# Patient Record
Sex: FEMALE | Race: WHITE | NOT HISPANIC OR LATINO | Employment: STUDENT | URBAN - METROPOLITAN AREA
[De-identification: names, ages, dates, MRNs, and addresses within clinical notes are randomized per-mention and may not be internally consistent; named-entity substitution may affect disease eponyms.]

---
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
## 2024-01-02 ENCOUNTER — AMB VIDEO VISIT (OUTPATIENT)
Dept: OTHER | Facility: HOSPITAL | Age: 19
End: 2024-01-02

## 2024-01-02 VITALS — WEIGHT: 150 LBS | RESPIRATION RATE: 18 BRPM | BODY MASS INDEX: 25.61 KG/M2 | HEIGHT: 64 IN | HEART RATE: 68 BPM

## 2024-01-02 DIAGNOSIS — N30.00 ACUTE CYSTITIS WITHOUT HEMATURIA: Primary | ICD-10-CM

## 2024-01-02 PROCEDURE — ECARE PR SL URGENT CARE VIRTUAL VISIT: Performed by: PHYSICIAN ASSISTANT

## 2024-01-02 RX ORDER — CEPHALEXIN 500 MG/1
500 CAPSULE ORAL EVERY 12 HOURS SCHEDULED
Qty: 14 CAPSULE | Refills: 0 | Status: SHIPPED | OUTPATIENT
Start: 2024-01-02 | End: 2024-01-09

## 2024-01-02 RX ORDER — PHENAZOPYRIDINE HYDROCHLORIDE 200 MG/1
200 TABLET, FILM COATED ORAL
Qty: 10 TABLET | Refills: 0 | Status: SHIPPED | OUTPATIENT
Start: 2024-01-02

## 2024-01-02 NOTE — CARE ANYWHERE EVISITS
Visit Summary for FLORENCIO FLORES - Gender: Female - Date of Birth: 2005  Date: [id number] - Duration: 12 minutes  Patient: FLORENCIO FLORES  Provider: Shannon Severino PA-C    Patient Contact Information  Address  [address] [place] [address]  [phone]    Visit Topics  uti [Added By: Self - 2024-01-02]    Triage Questions   What is your current physical address in the event of a medical emergency? Answer []  Are you allergic to any medications? Answer []  Are you now or could you be pregnant? Answer []  Do you have any immune system compromise or chronic lung   disease? Answer []  Do you have any vulnerable family members in the home (infant, pregnant, cancer, elderly)? Answer []     Conversation Transcripts  [0A][0A] [Notification] You are connected with Shannon Severino PA-C, Urgent Care Specialist.[0A][Notification] FLORENCIO FLORES is located in [place].[0A][Notification] FLORENCIO FLORES has shared health history...[0A]    Diagnosis  Acute cystitis without hematuria    Procedures  Value: 29400 Code: CPT-4 UNLISTED E&M SERVICE    Medications Prescribed    No prescriptions ordered    Electronically signed by: Severino PA-C, Shannon(NPI [id number])

## 2024-01-02 NOTE — PATIENT INSTRUCTIONS
"Schedule a follow-up appointment with your primary care physician for recheck in 2-3 days. If you cannot see your PCP, you can schedule a follow up appointment at a [facility]. Go to the emergency department if you develop any new or worsening symptoms including back pain, fevers, or anything else that is concerning.    Excuses can be found in \"Letters\" section of [employer] yasmin. Can print if opened from a web browser  Care Anywhere phone number is [phone] if you need assistance or have further questions    [phone] GRABIEL ([phone])  Schedule or Reschedule Outpatient Testing - Option 2  Billing - Option 3  General Info - Option 4  Cignifit Help - Option 5  Comprehensive Spine Program - Option 6   COVID - Option 7    Urinary Tract Infection in Women   WHAT YOU NEED TO KNOW:   A urinary tract infection (UTI) is caused by bacteria that get inside your urinary tract. Your urinary tract includes your kidneys, ureters, bladder, and urethra. A UTI is more common in your lower urinary tract, which includes your bladder and urethra.       DISCHARGE INSTRUCTIONS:   Return to the emergency department if:   You are urinating very little or not at all.    You have a high fever with shaking chills.    You have side or back pain that gets worse.    Call your doctor if:   You have a fever.    You do not feel better after 2 days of taking antibiotics.    You have new symptoms, such as blood or pus in your urine.    You are vomiting.    You have questions or concerns about your condition or care.    Medicines:   Antibiotics  treat a bacterial infection. If you have UTIs often (called recurrent UTIs), you may be given antibiotics to take regularly. You will be given directions for when and how to use antibiotics. The goal is to prevent UTIs but not cause antibiotic resistance by using antibiotics too often.    Medicines  may be given to decrease pain and burning when you urinate. They will also help decrease the feeling " that you need to urinate often. These medicines may make your urine orange or red.    Take your medicine as directed.  Contact your healthcare provider if you think your medicine is not helping or if you have side effects. Tell your provider if you are allergic to any medicine. Keep a list of the medicines, vitamins, and herbs you take. Include the amounts, and when and why you take them. Bring the list or the pill bottles to follow-up visits. Carry your medicine list with you in case of an emergency.    Follow up with your doctor as directed:  Write down your questions so you remember to ask them during your visits.  Prevent another UTI:   Empty your bladder often.  Urinate and empty your bladder as soon as you feel the need. Do not hold your urine for long periods of time.    Wipe from front to back after you urinate or have a bowel movement.  This will help prevent germs from getting into your urinary tract through your urethra.    Drink liquids as directed.  Ask how much liquid to drink each day and which liquids are best for you. You may need to drink more liquids than usual to help flush out the bacteria. Do not drink alcohol, caffeine, or citrus juices. These can irritate your bladder and increase your symptoms. Your healthcare provider may recommend cranberry juice to help prevent a UTI.    Urinate before and after you have sex.  This can help flush out bacteria passed during sex.    Do not douche or use feminine deodorants.  These can change the chemical balance in your vagina.    Change sanitary pads or tampons often.  This will help prevent germs from getting into your urinary tract.    Talk to your healthcare provider about your birth control method.  You may need to change your method if it is increasing your risk for UTIs.    Wear cotton underwear and clothes that are loose.  Tight pants and nylon underwear can trap moisture and cause bacteria to grow.    Vaginal estrogen may be recommended.  This  medicine helps prevent UTIs in women who have gone through menopause or are in jhonatan-menopause.    Do pelvic muscle exercises often.  Pelvic muscle exercises may help you start and stop urinating. Strong pelvic muscles may help you empty your bladder easier. Squeeze these muscles tightly for 5 seconds like you are trying to hold back urine. Then relax for 5 seconds. Gradually work up to squeezing for 10 seconds. Do 3 sets of 15 repetitions a day, or as directed.    © Copyright [employer] 2023 Information is for End User's use only and may not be sold, redistributed or otherwise used for commercial purposes.  The above information is an  only. It is not intended as medical advice for individual conditions or treatments. Talk to your doctor, nurse or pharmacist before following any medical regimen to see if it is safe and effective for you.

## 2024-01-02 NOTE — PROGRESS NOTES
Required Documentation:  Encounter provider Shannon D Severino, PA-C    Provider located at [facility]  VIRTUAL CARE   [address] [address]    Identify all parties in room with patient during virtual visit:  No one else    The patient was identified by name and date of birth. Asia Alford was informed that this is a telemedicine visit and that the visit is being conducted through the [employer] platform. She agrees to proceed..  My office door was closed. No one else was in the room.  She acknowledged consent and understanding of privacy and security of the video platform. The patient has agreed to participate and understands they can discontinue the visit at any time.    Verification of patient location:    Patient is located at home in the following state in which I hold an active license [place]    Patient is aware this is a billable service.     Reason for visit is No chief complaint on file.       Subjective  HPI   Pt reports UTI again started 2 days ago. Started menstruating 2-3 days ago, had severe menstrual cramps which did not improve for 24 hours despite heat and OTC pain meds. Reports dysuria, pain after urination, frequency, urgency, voiding small amounts. No new hematuria, back pain, fevers. Had temp 99.3 a few days ago. Denies change to vaginal discharge prior to starting mense, concern for STI.     Past Medical History:   Diagnosis Date    Encopresis 6/10/2021       No past surgical history on file.     No Known Allergies    Review of Systems   Constitutional:  Negative for fever.   HENT:  Negative for nosebleeds.    Eyes:  Negative for redness.   Respiratory:  Negative for shortness of breath.    Cardiovascular:  Negative for chest pain.   Gastrointestinal:  Negative for blood in stool.   Genitourinary:  Positive for dysuria, frequency, pelvic pain and urgency. Negative for hematuria.   Musculoskeletal:  Negative for gait problem.  "  Skin:  Negative for rash.   Neurological:  Negative for seizures.   Psychiatric/Behavioral:  Negative for behavioral problems.        Video Exam    Vitals:    01/02/24 1750   Pulse: 68   Resp: 18   Weight: 68 kg (150 lb)   Height: 5' 3.5\" (1.613 m)       Physical Exam  Constitutional:       General: She is not in acute distress.     Appearance: Normal appearance. She is not toxic-appearing.   HENT:      Head: Normocephalic and atraumatic.      Nose: No rhinorrhea.      Mouth/Throat:      Mouth: Mucous membranes are moist.   Eyes:      Conjunctiva/sclera: Conjunctivae normal.      Comments: glasses   Cardiovascular:      Rate and Rhythm: Normal rate.   Pulmonary:      Effort: Pulmonary effort is normal. No respiratory distress.      Breath sounds: No wheezing (no gross audible wheeze through computer).   Musculoskeletal:      Cervical back: Normal range of motion.   Skin:     Findings: No rash (on face or neck).   Neurological:      Mental Status: She is alert.      Cranial Nerves: No dysarthria or facial asymmetry.   Psychiatric:         Mood and Affect: Mood normal.         Behavior: Behavior normal.         Visit Time  Total Visit Duration: 12 minutes    Assessment/Plan:    Diagnoses and all orders for this visit:    Acute cystitis without hematuria  -     cephalexin (KEFLEX) 500 mg capsule; Take 1 capsule (500 mg total) by mouth every 12 (twelve) hours for 7 days  -     Urine culture; Future  -     phenazopyridine (PYRIDIUM) 200 mg tablet; Take 1 tablet (200 mg total) by mouth 3 (three) times a day with meals        Patient Instructions   Schedule a follow-up appointment with your primary care physician for recheck in 2-3 days. If you cannot see your PCP, you can schedule a follow up appointment at a [facility]. Go to the emergency department if you develop any new or worsening symptoms including back pain, fevers, or anything else that is concerning.    Excuses can be found in \"Letters\" section of " [employer] yasmin. Can print if opened from a web browser  Care Anywhere phone number is [phone] if you need assistance or have further questions    [phone] GRABIEL ([phone])  Schedule or Reschedule Outpatient Testing - Option 2  Billing - Option 3  General Info - Option 4  [employer] Help - Option 5  Comprehensive Spine Program - Option 6   COVID - Option 7    Urinary Tract Infection in Women   WHAT YOU NEED TO KNOW:   A urinary tract infection (UTI) is caused by bacteria that get inside your urinary tract. Your urinary tract includes your kidneys, ureters, bladder, and urethra. A UTI is more common in your lower urinary tract, which includes your bladder and urethra.       DISCHARGE INSTRUCTIONS:   Return to the emergency department if:   You are urinating very little or not at all.    You have a high fever with shaking chills.    You have side or back pain that gets worse.    Call your doctor if:   You have a fever.    You do not feel better after 2 days of taking antibiotics.    You have new symptoms, such as blood or pus in your urine.    You are vomiting.    You have questions or concerns about your condition or care.    Medicines:   Antibiotics  treat a bacterial infection. If you have UTIs often (called recurrent UTIs), you may be given antibiotics to take regularly. You will be given directions for when and how to use antibiotics. The goal is to prevent UTIs but not cause antibiotic resistance by using antibiotics too often.    Medicines  may be given to decrease pain and burning when you urinate. They will also help decrease the feeling that you need to urinate often. These medicines may make your urine orange or red.    Take your medicine as directed.  Contact your healthcare provider if you think your medicine is not helping or if you have side effects. Tell your provider if you are allergic to any medicine. Keep a list of the medicines, vitamins, and herbs you take. Include the amounts, and when and why  you take them. Bring the list or the pill bottles to follow-up visits. Carry your medicine list with you in case of an emergency.    Follow up with your doctor as directed:  Write down your questions so you remember to ask them during your visits.  Prevent another UTI:   Empty your bladder often.  Urinate and empty your bladder as soon as you feel the need. Do not hold your urine for long periods of time.    Wipe from front to back after you urinate or have a bowel movement.  This will help prevent germs from getting into your urinary tract through your urethra.    Drink liquids as directed.  Ask how much liquid to drink each day and which liquids are best for you. You may need to drink more liquids than usual to help flush out the bacteria. Do not drink alcohol, caffeine, or citrus juices. These can irritate your bladder and increase your symptoms. Your healthcare provider may recommend cranberry juice to help prevent a UTI.    Urinate before and after you have sex.  This can help flush out bacteria passed during sex.    Do not douche or use feminine deodorants.  These can change the chemical balance in your vagina.    Change sanitary pads or tampons often.  This will help prevent germs from getting into your urinary tract.    Talk to your healthcare provider about your birth control method.  You may need to change your method if it is increasing your risk for UTIs.    Wear cotton underwear and clothes that are loose.  Tight pants and nylon underwear can trap moisture and cause bacteria to grow.    Vaginal estrogen may be recommended.  This medicine helps prevent UTIs in women who have gone through menopause or are in jhonatan-menopause.    Do pelvic muscle exercises often.  Pelvic muscle exercises may help you start and stop urinating. Strong pelvic muscles may help you empty your bladder easier. Squeeze these muscles tightly for 5 seconds like you are trying to hold back urine. Then relax for 5 seconds. Gradually  work up to squeezing for 10 seconds. Do 3 sets of 15 repetitions a day, or as directed.    © Copyright [employer] 2023 Information is for End User's use only and may not be sold, redistributed or otherwise used for commercial purposes.  The above information is an  only. It is not intended as medical advice for individual conditions or treatments. Talk to your doctor, nurse or pharmacist before following any medical regimen to see if it is safe and effective for you.

## 2024-01-03 ENCOUNTER — LAB (OUTPATIENT)
Dept: LAB | Facility: CLINIC | Age: 19
End: 2024-01-03
Payer: COMMERCIAL

## 2024-01-03 DIAGNOSIS — N30.00 ACUTE CYSTITIS WITHOUT HEMATURIA: ICD-10-CM

## 2024-01-03 PROCEDURE — 87086 URINE CULTURE/COLONY COUNT: CPT

## 2024-01-04 LAB — BACTERIA UR CULT: NORMAL

## 2024-01-13 DIAGNOSIS — F41.1 GENERALIZED ANXIETY DISORDER: ICD-10-CM

## 2024-01-14 RX ORDER — CITALOPRAM HYDROBROMIDE 10 MG/1
10 TABLET ORAL DAILY
Qty: 90 TABLET | Refills: 1 | Status: SHIPPED | OUTPATIENT
Start: 2024-01-14

## 2024-01-15 DIAGNOSIS — K21.9 GASTROESOPHAGEAL REFLUX DISEASE WITHOUT ESOPHAGITIS: ICD-10-CM

## 2024-01-16 RX ORDER — FAMOTIDINE 20 MG/1
TABLET, FILM COATED ORAL
Qty: 180 TABLET | Refills: 0 | OUTPATIENT
Start: 2024-01-16

## 2024-01-16 NOTE — TELEPHONE ENCOUNTER
Patient not seen in office for over a year please call and schedule follow-up appointment.  No medication renewal until seen in office.  Thank you

## 2024-01-26 DIAGNOSIS — K58.2 IRRITABLE BOWEL SYNDROME WITH BOTH CONSTIPATION AND DIARRHEA: ICD-10-CM

## 2024-01-29 RX ORDER — LINACLOTIDE 290 UG/1
CAPSULE, GELATIN COATED ORAL
Qty: 30 CAPSULE | Refills: 2 | Status: SHIPPED | OUTPATIENT
Start: 2024-01-29

## 2024-02-14 ENCOUNTER — TELEPHONE (OUTPATIENT)
Dept: PSYCHIATRY | Facility: CLINIC | Age: 19
End: 2024-02-14

## 2024-02-14 NOTE — TELEPHONE ENCOUNTER
Patient is calling regarding cancelling an appointment.    Date/Time: 2/14/2024 11:30 am    Reason: pt lmm on 2/13/2024 with no reason given     Patient was rescheduled: YES [] NO [x]  If yes, when was Patient reschedule for:     Patient requesting call back to reschedule: YES [] NO [x]

## 2024-02-21 PROBLEM — N39.0 RECURRENT UTI: Status: RESOLVED | Noted: 2021-06-10 | Resolved: 2024-02-21

## 2024-02-28 ENCOUNTER — AMB VIDEO VISIT (OUTPATIENT)
Dept: OTHER | Facility: HOSPITAL | Age: 19
End: 2024-02-28

## 2024-02-28 DIAGNOSIS — J02.0 STREP PHARYNGITIS: Primary | ICD-10-CM

## 2024-02-28 PROCEDURE — ECARE PR SL URGENT CARE VIRTUAL VISIT: Performed by: PHYSICIAN ASSISTANT

## 2024-02-28 RX ORDER — AMOXICILLIN 500 MG/1
500 TABLET, FILM COATED ORAL 2 TIMES DAILY
Qty: 20 TABLET | Refills: 0 | Status: SHIPPED | OUTPATIENT
Start: 2024-02-28 | End: 2024-03-09

## 2024-02-28 NOTE — PROGRESS NOTES
Required Documentation:  Encounter provider Melly Rueda PA-C    Provider located at [facility]  VIRTUAL CARE   [address] [address]    Identify all parties in room with patient during virtual visit:  No one else    The patient was identified by name and date of birth. Asia Alford was informed that this is a telemedicine visit and that the visit is being conducted through the [facility] [employer] platform. She agrees to proceed..  My office door was closed. No one else was in the room.  She acknowledged consent and understanding of privacy and security of the video platform. The patient has agreed to participate and understands they can discontinue the visit at any time.    Verification of patient location:    Patient is located at Home in the following state in which I hold an active license [place]    Patient is aware this is a billable service.     Reason for visit is No chief complaint on file.       Subjective  HPI   Patient states that she started with a sore throat and then a 102 fever, bodyaches, headache, vomiting a few times.  Now her throat is hurting really bad.  She still has a fever of 100.   She denies congestion.She states its a really bad sore throat. She states there is white spots in her throat.     Past Medical History:   Diagnosis Date    Encopresis 6/10/2021       No past surgical history on file.     No Known Allergies    Review of Systems   Constitutional:  Positive for fever.   HENT:  Positive for sore throat.    Respiratory: Negative.     Gastrointestinal: Negative.    Musculoskeletal: Negative.    Neurological: Negative.    Psychiatric/Behavioral: Negative.         Video Exam    There were no vitals filed for this visit.    Physical Exam  Constitutional:       General: She is not in acute distress.     Appearance: Normal appearance. She is not ill-appearing.   HENT:      Nose: No congestion.      Mouth/Throat:      Pharynx:  Posterior oropharyngeal erythema present.      Comments: Pt reports exu  Pulmonary:      Effort: Pulmonary effort is normal.   Lymphadenopathy:      Cervical: Cervical adenopathy present.   Skin:     General: Skin is dry.   Neurological:      General: No focal deficit present.      Mental Status: She is alert and oriented to person, place, and time.   Psychiatric:         Mood and Affect: Mood normal.         Behavior: Behavior normal.         Visit Time  Total Visit Duration: 5 minutes    Assessment/Plan:    Diagnoses and all orders for this visit:    Strep pharyngitis  -     amoxicillin (AMOXIL) 500 MG tablet; Take 1 tablet (500 mg total) by mouth 2 (two) times a day for 10 days        Patient Instructions   Strep Throat   WHAT YOU NEED TO KNOW:   Strep throat is a throat infection caused by bacteria. It is easily spread from person to person.  DISCHARGE INSTRUCTIONS:   Call 911 for any of the following:   You have trouble breathing.      Return to the emergency department if:   You have new symptoms like a bad headache, stiff neck, chest pain, or vomiting.    You are drooling because you cannot swallow your spit.    Contact your healthcare provider if:   You have a fever.    You have a rash or ear pain.    You have green, yellow-brown, or bloody mucus when you cough or blow your nose.    You are unable to drink anything.    You have questions or concerns about your condition or care.    Medicines:   Antibiotics  help treat your strep throat. You should feel better within 2 to 3 days after you start antibiotics.    Take your medicine as directed.  Contact your healthcare provider if you think your medicine is not helping or if you have side effects. Tell your provider if you are allergic to any medicine. Keep a list of the medicines, vitamins, and herbs you take. Include the amounts, and when and why you take them. Bring the list or the pill bottles to follow-up visits. Carry your medicine list with you in case of  an emergency.    Manage your symptoms:   Use lozenges, ice, soft foods, or popsicles  to soothe your throat.    Drink juice, milk shakes, or soup  if your throat is too sore to eat solid food. Drinking liquids can also help prevent dehydration.    Gargle with salt water.  Mix ¼ teaspoon salt in a glass of warm water and gargle. This may help reduce swelling in your throat.    Do not smoke.  Nicotine and other chemicals in cigarettes and cigars can cause lung damage and make your symptoms worse. Ask your healthcare provider for information if you currently smoke and need help to quit. E-cigarettes or smokeless tobacco still contain nicotine. Talk to your healthcare provider before you use these products.    Return to work or school  24 hours after you start antibiotic medicine.  Prevent the spread of strep throat:   Wash your hands often.  Use soap and water. Wash your hands after you use the bathroom, change a child's diapers, or sneeze. Wash your hands before you prepare or eat food.     Do not share food or drinks.  Replace your toothbrush after you have taken antibiotics for 24 hours.    Follow up with your doctor as directed:  Write down your questions so you remember to ask them during your visits.  © Copyright [employer] 2023 Information is for End User's use only and may not be sold, redistributed or otherwise used for commercial purposes.  The above information is an  only. It is not intended as medical advice for individual conditions or treatments. Talk to your doctor, nurse or pharmacist before following any medical regimen to see if it is safe and effective for you.

## 2024-02-28 NOTE — CARE ANYWHERE EVISITS
Visit Summary for FLORENCIO FLORES - Gender: Female - Date of Birth: 2005  Date: [id number] - Duration: 4 minutes  Patient: FLORENCIO FLORES  Provider: Melly Rueda PA-C    Patient Contact Information  Address  [address] [place] [id number]  [phone]    Visit Topics  Flu-Like Symptoms [Added By: Self - 2024-02-28]  Fever [Added By: Self - 2024-02-28]  Headache [Added By: Self - 2024-02-28]    Triage Questions   What is your current physical address in the event of a medical emergency? Answer []  Are you allergic to any medications? Answer []  Are you now or could you be pregnant? Answer []  Do you have any immune system compromise or chronic lung   disease? Answer []  Do you have any vulnerable family members in the home (infant, pregnant, cancer, elderly)? Answer []     Conversation Transcripts  [0A][0A] [Notification] You are connected with Melly Rueda PA-C, Urgent Care Specialist.[0A][Notification] FLORENCIO FLORES is located in [place].[0A][Notification] FLORENCIO FLORES has shared health history...[0A]    Diagnosis  Streptococcal pharyngitis    Procedures  Value: [id number] Code: CPT-4 UNLISTED E&M SERVICE    Medications Prescribed    No prescriptions ordered    Electronically signed by: Melly Rueda PA-C(NPI [id number])

## 2024-03-01 ENCOUNTER — OFFICE VISIT (OUTPATIENT)
Dept: URGENT CARE | Facility: CLINIC | Age: 19
End: 2024-03-01
Payer: COMMERCIAL

## 2024-03-01 VITALS
RESPIRATION RATE: 20 BRPM | DIASTOLIC BLOOD PRESSURE: 64 MMHG | BODY MASS INDEX: 25.52 KG/M2 | OXYGEN SATURATION: 99 % | WEIGHT: 144 LBS | HEART RATE: 42 BPM | HEIGHT: 63 IN | TEMPERATURE: 96.9 F | SYSTOLIC BLOOD PRESSURE: 110 MMHG

## 2024-03-01 DIAGNOSIS — U07.1 COVID-19: Primary | ICD-10-CM

## 2024-03-01 LAB
S PYO AG THROAT QL: NEGATIVE
SARS-COV-2 AG UPPER RESP QL IA: POSITIVE
VALID CONTROL: ABNORMAL

## 2024-03-01 PROCEDURE — 99213 OFFICE O/P EST LOW 20 MIN: CPT | Performed by: FAMILY MEDICINE

## 2024-03-01 PROCEDURE — 87880 STREP A ASSAY W/OPTIC: CPT | Performed by: FAMILY MEDICINE

## 2024-03-01 PROCEDURE — 87811 SARS-COV-2 COVID19 W/OPTIC: CPT | Performed by: FAMILY MEDICINE

## 2024-03-01 RX ORDER — BENZONATATE 200 MG/1
200 CAPSULE ORAL 3 TIMES DAILY PRN
Qty: 20 CAPSULE | Refills: 0 | Status: SHIPPED | OUTPATIENT
Start: 2024-03-01

## 2024-03-01 RX ORDER — AZITHROMYCIN 250 MG/1
TABLET, FILM COATED ORAL
Qty: 6 TABLET | Refills: 0 | Status: SHIPPED | OUTPATIENT
Start: 2024-03-01 | End: 2024-03-05

## 2024-03-01 NOTE — PROGRESS NOTES
[facility]        NAME: Asia Alford is a 19 y.o. female  : 2005    MRN: [id number]  DATE: 2024  TIME: 10:41 AM    Assessment and Plan   COVID-19 [U07.1]  1. COVID-19  POCT rapid strepA    Poct Covid 19 Rapid Antigen Test    azithromycin (ZITHROMAX) 250 mg tablet    benzonatate (TESSALON) 200 MG capsule    phenol (CHLORASEPTIC) 1.4 % mucosal liquid    CANCELED: Throat culture        -Symptomatic treatment of covid/sore throat - azithromycin/tessalon pearls/chloraseptic.  Bigeminy noticed on her auscultation expected to resolve with resolution of infection.  -Pt declined paxlovid treatment.  -Encouraged fluids and rest.  -Stop amoxicillan as rapid strep was negative today.     Patient Instructions     Follow up with PCP in 3-5 days.  Proceed to  ER if symptoms worsen.    If tests have been performed at [facility], our office will contact you with results if changes need to be made to the care plan discussed with you at the visit.  [facility]    Chief Complaint     Chief Complaint   Patient presents with    Cold Like Symptoms     C/O coughing, body aches, nausea and Temp. 101 F for five days ago and a sore throat  and slightly mild ear pain four days ago. The patient had a virtual yasmin. And was prescribe amoxicillin but has not gotten better.         History of Present Illness     18 yo female presents with 4 day history of fever (Tmax 102), cough, and intense sore throat.  Pt denies chest pain, palpitations, sinus pain/pressure, congestion, or chills.  She did a virtual medicine call on day 2 of symptoms and was prescribed amoxicillan for suspected strep pharyngitis.  She has had no symptom relief with amoxicillin, tylenol, or motrin which prompted her visit today.      Review of Systems   Review of Systems   Constitutional:  Positive for fever. Negative for chills and diaphoresis.   HENT:  Positive for ear pain, postnasal drip and sore throat.  Negative for congestion, sinus pressure and sinus pain.    Respiratory:  Positive for cough. Negative for shortness of breath.    Cardiovascular:  Negative for chest pain.   Gastrointestinal:  Negative for abdominal pain.     Current Medications       Current Outpatient Medications:     amoxicillin (AMOXIL) 500 MG tablet, Take 1 tablet (500 mg total) by mouth 2 (two) times a day for 10 days, Disp: 20 tablet, Rfl: 0    azithromycin (ZITHROMAX) 250 mg tablet, Take 2 tablets today then 1 tablet daily x 4 days, Disp: 6 tablet, Rfl: 0    benzonatate (TESSALON) 200 MG capsule, Take 1 capsule (200 mg total) by mouth 3 (three) times a day as needed for cough, Disp: 20 capsule, Rfl: 0    citalopram (CeleXA) 10 mg tablet, TAKE 1 TABLET BY MOUTH EVERY DAY, Disp: 90 tablet, Rfl: 1    famotidine (PEPCID) 20 mg tablet, TAKE 1 TABLET BY MOUTH TWICE A DAY, Disp: 180 tablet, Rfl: 0    hydrOXYzine HCL (ATARAX) 10 mg tablet, Take 1 tablet (10 mg total) by mouth daily as needed for anxiety, Disp: 10 tablet, Rfl: 0    Linzess 290 MCG CAPS, TAKE 1 CAPSULE BY MOUTH EVERY DAY IN THE MORNING, Disp: 30 capsule, Rfl: 2    phenazopyridine (PYRIDIUM) 200 mg tablet, Take 1 tablet (200 mg total) by mouth 3 (three) times a day with meals, Disp: 10 tablet, Rfl: 0    phenol (CHLORASEPTIC) 1.4 % mucosal liquid, Apply 1 spray to the mouth or throat every 2 (two) hours as needed (Sore throat), Disp: 20 mL, Rfl: 0    Trulance 3 MG TABS, TAKE 1 TABLET BY MOUTH EVERY DAY, Disp: 30 tablet, Rfl: 35    Current Allergies     Allergies as of 03/01/2024    (No Known Allergies)            The following portions of the patient's history were reviewed and updated as appropriate: allergies, current medications, past family history, past medical history, past social history, past surgical history and problem list.     Past Medical History:   Diagnosis Date    Encopresis 6/10/2021       History reviewed. No pertinent surgical history.    Family History   Problem  "Relation Age of Onset    No Known Problems Mother     Heart attack Father     Early death Father     Heart disease Father     Breast cancer Maternal Grandmother     Diabetes Maternal Grandmother     Hypertension Maternal Grandmother     Cancer Maternal Grandfather         Lung    Heart disease Maternal Grandfather     Depression Paternal Grandmother     Diabetes Paternal Grandmother     Heart disease Paternal Grandmother     Hypertension Paternal Grandmother     Mental illness Paternal Grandmother     Heart disease Paternal Grandfather     Stroke Paternal Grandfather     No Known Problems Sister     No Known Problems Brother     No Known Problems Maternal Aunt     No Known Problems Maternal Uncle     No Known Problems Paternal Aunt     No Known Problems Paternal Uncle          Medications have been verified.        Objective   /64   Pulse (!) 42   Temp (!) 96.9 °F (36.1 °C)   Resp 20   Ht 5' 3\" (1.6 m)   Wt 65.3 kg (144 lb)   LMP 02/29/2024   SpO2 99%   BMI 25.51 kg/m²   Patient's last menstrual period was 02/29/2024.       Physical Exam     Physical Exam  Vitals and nursing note reviewed.   Constitutional:       General: She is in acute distress.      Appearance: Normal appearance. She is normal weight. She is not ill-appearing, toxic-appearing or diaphoretic.   HENT:      Head: Normocephalic and atraumatic.      Right Ear: Tympanic membrane normal.      Left Ear: Tympanic membrane normal.      Nose: Nose normal.      Mouth/Throat:      Mouth: Mucous membranes are moist.      Pharynx: No oropharyngeal exudate or posterior oropharyngeal erythema.   Eyes:      General:         Right eye: No discharge.         Left eye: No discharge.      Conjunctiva/sclera: Conjunctivae normal.   Cardiovascular:      Rate and Rhythm: Normal rate. Rhythm irregular.      Comments: Bigeminy coupled heartbeat.  Pulmonary:      Effort: Pulmonary effort is normal. No respiratory distress.      Breath sounds: Normal breath " sounds. No wheezing, rhonchi or rales.   Skin:     General: Skin is warm.      Findings: No erythema.   Neurological:      General: No focal deficit present.      Mental Status: She is alert and oriented to person, place, and time.   Psychiatric:         Mood and Affect: Mood normal.         Behavior: Behavior normal.         Thought Content: Thought content normal.         Judgment: Judgment normal.

## 2024-03-07 ENCOUNTER — OFFICE VISIT (OUTPATIENT)
Dept: GASTROENTEROLOGY | Facility: CLINIC | Age: 19
End: 2024-03-07
Payer: COMMERCIAL

## 2024-03-07 VITALS
BODY MASS INDEX: 25.27 KG/M2 | HEART RATE: 72 BPM | DIASTOLIC BLOOD PRESSURE: 67 MMHG | HEIGHT: 63 IN | WEIGHT: 142.6 LBS | SYSTOLIC BLOOD PRESSURE: 102 MMHG

## 2024-03-07 DIAGNOSIS — K58.2 IRRITABLE BOWEL SYNDROME WITH BOTH CONSTIPATION AND DIARRHEA: Primary | ICD-10-CM

## 2024-03-07 PROCEDURE — 99214 OFFICE O/P EST MOD 30 MIN: CPT | Performed by: INTERNAL MEDICINE

## 2024-03-07 NOTE — PROGRESS NOTES
[facility] - Outpatient Follow-up Note  Asia Alford 19 y.o. female MRN: [id number]  Encounter: [id number]          ASSESSMENT AND PLAN:      1. Irritable bowel syndrome with both constipation and diarrhea  Fortunately doing well on combination of Linzess 290 mcg plus Trulance 3 mg daily  She has therapeutic failure of polyethylene gycol/MiraLax, Dulcolax, psyllium fiber, trulance  Therapeutic failure of Linzess 290 mcg (at all doses)  - therapeutic failure of linzess 290 mcg + miralax  - therapeutic failure of trulance  We discussed consideration of EGD/colonoscopy for objective evaluation, and to rule out malignancy or other etiologies for her persistent constipation and IBS symptoms, patient deferred at this time  Fortunately she denies any warning signs or symptoms at this time,  We will proceed with noninvasive evaluation  - Celiac Disease Antibody Profile; Future  - CBC; Future  - Comprehensive metabolic panel; Future  - Chronic Hepatitis Panel; Future  - Iron Panel (Includes Ferritin, Iron Sat%, Iron, and TIBC); Future  - Calprotectin,Fecal; Future  - TSH, 3rd generation with Free T4 reflex; Future  ______________________________________________________________________    SUBJECTIVE:      Patient is a 19-year-old female who sees me in office visit follow-up for irritable bowel syndrome with constipation and diarrhea.  She was last seen in the office via virtual visit 2 years ago.  She is present with her brother at today's visit, who contributes to history.  She is taking Linzess 290 mcg plus Trulance 3 mg with her first meal of the day.  With this she has liquid bowel movements, approximately 1-6 times per day.  She does describe symptoms of incomplete evacuation, states that the frequency of her stools does not bother her, she is not having abdominal pain, bloating or other GI symptoms.  She does note changes associated with her menstrual cycle.  She denies any unintentional  "weight loss.    No previous endoscopic evaluation  Non-invasive evaluation negative for H pylori, stool studies, fecal calprotectin    Rx trulance 3 mg daily,   Linzess 290 mcg daily  Pepcid 20 mg daily    Abdominal Pain  This is a recurrent problem. The current episode started more than 1 year ago. The onset quality is sudden. The problem occurs every several days. The most recent episode lasted 4 hours. The problem has been gradually improving since onset. The pain is located in the epigastric region. The pain is at a severity of 7/10. The quality of the pain is described as cramping and sharp. Associated symptoms include constipation, diarrhea and flatus. Pertinent negatives include no anorexia, arthralgias, belching, dysuria, fever, frequency, headaches, hematochezia, hematuria, melena, myalgias, nausea or vomiting. The symptoms are relieved by being still, bowel movements, passing flatus and recumbency.     Answers submitted by the patient for this visit:  Abdominal Pain Questionnaire (Submitted on 3/7/2024)  Chief Complaint: Abdominal pain  Radiates to: LLQ, LUQ, RLQ, RUQ, back  weight loss: No      I personally reviewed external procedure reports.     Objective     Blood pressure 102/67, pulse 72, height 5' 3\" (1.6 m), weight 64.7 kg (142 lb 9.6 oz), last menstrual period 02/29/2024. Body mass index is 25.26 kg/m².      PHYSICAL EXAM:      General Appearance:   Alert, cooperative, no distress   HEENT:   Normocephalic, atraumatic, anicteric.     Neck:  Supple, symmetrical, trachea midline   Lungs:   Equal chest rise and unlabored breathing, normal effort, no coughing.    Heart::   No visualized JVD.   Abdomen:   Soft, non-tender, non-distended; normal bowel sounds; no masses, no organomegaly    Rectal:   Deferred    Extremities:  No cyanosis, clubbing or edema    Pulses:  2+ and symmetric    Skin:  No jaundice, rashes, or lesions      Lab Results:   Lab Results   Component Value Date    WBC 6.8 07/22/2021    " HGB 12.4 07/22/2021    HCT 38.7 07/22/2021    MCV 83 07/22/2021     07/22/2021       Lab Results   Component Value Date    SODIUM 139 07/22/2021    K 3.9 07/22/2021     07/22/2021    CO2 23 07/22/2021    AGAP 8 02/18/2021    BUN 14 07/22/2021    CREATININE 0.76 07/22/2021    GLUC 93 07/22/2021    CALCIUM 9.5 02/18/2021    AST 19 07/22/2021    ALT 10 07/22/2021    ALKPHOS 96 02/18/2021    TP 7.2 07/22/2021    TBILI 0.4 07/22/2021       I personally reviewed relevant labs.    Radiology Results:   No results found.    I personally reviewed relevant images in PACS.

## 2024-04-22 ENCOUNTER — TELEMEDICINE (OUTPATIENT)
Dept: PSYCHIATRY | Facility: CLINIC | Age: 19
End: 2024-04-22
Payer: COMMERCIAL

## 2024-04-22 DIAGNOSIS — F41.1 GENERALIZED ANXIETY DISORDER: Primary | ICD-10-CM

## 2024-04-22 PROCEDURE — 99213 OFFICE O/P EST LOW 20 MIN: CPT | Performed by: PHYSICIAN ASSISTANT

## 2024-04-22 RX ORDER — CITALOPRAM 20 MG/1
20 TABLET ORAL DAILY
Qty: 30 TABLET | Refills: 2 | Status: SHIPPED | OUTPATIENT
Start: 2024-04-22 | End: 2024-07-21

## 2024-04-22 RX ORDER — TIMOLOL MALEATE 5 MG/ML
1 SOLUTION/ DROPS OPHTHALMIC DAILY
COMMUNITY
Start: 2024-02-27

## 2024-04-22 NOTE — PSYCH
"This note was not shared with the patient due to reasonable likelihood of causing patient harm     Virtual Regular Visit    Problem List Items Addressed This Visit       Generalized anxiety disorder - Primary    Relevant Medications    citalopram (CeleXA) 20 mg tablet     Reason for visit is   Chief Complaint   Patient presents with    Medication Management    Follow-up     Encounter provider Inna Monet PA-C    Provider located at [address]  [address]  [facility] [address]  [phone]    Recent Visits  No visits were found meeting these conditions.  Showing recent visits within past 7 days and meeting all other requirements  Today's Visits  Date Type Provider Dept   04/22/24 Telemedicine Inna Monet PA-C  [facility]   Showing today's visits and meeting all other requirements  Future Appointments  No visits were found meeting these conditions.  Showing future appointments within next 150 days and meeting all other requirements       After connecting through [employer], the patient was identified by name and date of birth. Asia Alford was informed that this is a telemedicine visit and that the visit is being conducted through the Epic Embedded platform. She agrees to proceed. which may not be secure and therefore, might not be HIPAA-compliant.  My office door was closed. No one else was in the room.  She acknowledged consent and understanding of privacy and security of the video platform. The patient has agreed to participate and understands they can discontinue the visit at any time.    SUBJECTIVE:    Asia Alford is a 19 y.o. female with a history of anxiety who presents for virtual appointment today.  She is currently lying in bed and states \"I have pretty bad cramps today.\"  She adds \"the meds are working but it is hard to tell.\"  She does report that \"it is better than before.\"  She " "feels that her generalized anxiety has improved.  She reports \"I am not anxious about every little thing.\"  She is willing to trial a higher dose of her medication for better efficacy.    She notes that her sleep schedule has been fluctuating and she has been waking up often.    She reports an adequate appetite.    No suicidal or homicidal thought, plan, or intent.    No auditory or visual hallucinations.  No delusions.    HPI ROS Appetite Changes and Sleep: normal appetite frequent nighttime awakenings    Review Of Systems:     Constitutional Negative   ENT Negative   Cardiovascular Negative   Respiratory Negative   Gastrointestinal As noted in HPI   Genitourinary Negative   Musculoskeletal Negative   Integumentary Negative   Neurological Negative   Endocrine Negative   Other Symptoms Negative and None          Substance Abuse History:    Social History     Substance and Sexual Activity   Drug Use Never       Family Psychiatric History:     Family History   Problem Relation Age of Onset    No Known Problems Mother     Heart attack Father     Early death Father     Heart disease Father     Breast cancer Maternal Grandmother     Diabetes Maternal Grandmother     Hypertension Maternal Grandmother     Cancer Maternal Grandfather         Lung    Heart disease Maternal Grandfather     Depression Paternal Grandmother     Diabetes Paternal Grandmother     Heart disease Paternal Grandmother     Hypertension Paternal Grandmother     Mental illness Paternal Grandmother     Heart disease Paternal Grandfather     Stroke Paternal Grandfather     No Known Problems Sister     No Known Problems Brother     No Known Problems Maternal Aunt     No Known Problems Maternal Uncle     No Known Problems Paternal Aunt     No Known Problems Paternal Uncle        Social History     Socioeconomic History    Marital status: Single     Spouse name: Not on file    Number of children: Not on file    Years of education: Not on file    Highest " education level: Not on file   Occupational History    Not on file   Tobacco Use    Smoking status: Never    Smokeless tobacco: Never    Tobacco comments:     former vapor   Vaping Use    Vaping status: Every Day    Substances: Nicotine, Flavoring   Substance and Sexual Activity    Alcohol use: Never    Drug use: Never    Sexual activity: Never   Other Topics Concern    Not on file   Social History Narrative    ** Merged History Encounter **          Social Determinants of Health     Financial Resource Strain: Not on file   Food Insecurity: Not on file   Transportation Needs: Not on file   Physical Activity: Not on file   Stress: Not on file   Social Connections: Not on file   Intimate Partner Violence: Not on file   Housing Stability: Not on file       Past Medical History:   Diagnosis Date    Encopresis 6/10/2021       History reviewed. No pertinent surgical history.    Current Outpatient Medications   Medication Sig Dispense Refill    citalopram (CeleXA) 20 mg tablet Take 1 tablet (20 mg total) by mouth daily 30 tablet 2    Vienva 0.1-20 MG-MCG per tablet Take 1 tablet by mouth daily Take at the same time      benzonatate (TESSALON) 200 MG capsule Take 1 capsule (200 mg total) by mouth 3 (three) times a day as needed for cough 20 capsule 0    famotidine (PEPCID) 20 mg tablet TAKE 1 TABLET BY MOUTH TWICE A  tablet 0    hydrOXYzine HCL (ATARAX) 10 mg tablet Take 1 tablet (10 mg total) by mouth daily as needed for anxiety 10 tablet 0    Linzess 290 MCG CAPS TAKE 1 CAPSULE BY MOUTH EVERY DAY IN THE MORNING 30 capsule 2    phenazopyridine (PYRIDIUM) 200 mg tablet Take 1 tablet (200 mg total) by mouth 3 (three) times a day with meals 10 tablet 0    phenol (CHLORASEPTIC) 1.4 % mucosal liquid Apply 1 spray to the mouth or throat every 2 (two) hours as needed (Sore throat) 20 mL 0    Trulance 3 MG TABS TAKE 1 TABLET BY MOUTH EVERY DAY 30 tablet 35     No current facility-administered medications for this visit.         No Known Allergies    The following portions of the patient's history were reviewed and updated as appropriate: allergies, current medications, past family history, past medical history, past social history, past surgical history, and problem list.    OBJECTIVE:     Mental Status Examination:    Appearance age appropriate, casually dressed   Behavior pleasant, cooperative   Speech normal volume, normal pitch   Mood Anxious   Affect Mood congruent   Thought Processes logical   Associations intact associations   Thought Content normal   Perceptual Disturbances: none   Abnormal Thoughts  Risk Potential Suicidal ideation - None  Homicidal ideation - None  Potential for aggression - No   Orientation oriented to person, place, time/date and situation   Memory recent and remote memory grossly intact   Cosciousness alert and awake   Attention Span attention span and concentration are age appropriate   Intellect Appears to be of Average Intelligence   Insight age appropriate    Judgement good    Muscle Strength and  Gait muscle strength and tone were normal   Language no difficulty naming common objects   Fund of Knowledge displays adequate knowledge of current events   Pain none   Pain Scale 0        Laboratory Results: No results found for this or any previous visit.    Assessment/Plan:       Diagnoses and all orders for this visit:    Generalized anxiety disorder  -     citalopram (CeleXA) 20 mg tablet; Take 1 tablet (20 mg total) by mouth daily    Other orders  -     Vienva 0.1-20 MG-MCG per tablet; Take 1 tablet by mouth daily Take at the same time          Treatment Recommendations- Risks Benefits      Will increase Celexa to 20 mg daily for anxiety      Continue the following medication:    Atarax 10 mg daily as needed for anxiety    We will follow-up in 2 months or sooner if questions or concerns arise.  She is aware of emergent versus nonemergent mental health resources.  She is able to contract for her own  safety at this time.    Risks, Benefits And Possible Side Effects Of Medications:  discussed    Controlled Medication Discussion: not applicable     Psychotherapy Provided: no    Treatment Plan:    Completed and signed during the session: Not applicable - Treatment Plan not due at this session    I spent 23 minutes with the patient during this visit.    This note was completed in part utilizing Dragon dictation Software. Grammatical, translation, syntax errors, random word insertions, spelling mistakes, and incomplete sentences may be an occasional consequence of this system secondary to software limitations with voice recognition, ambient noise, and hardware issues. If you have any questions or concerns about the content, text, or information contained within the body of this dictation, please contact the provider for clarification.     Inna Monet PA-C 04/22/24

## 2024-04-22 NOTE — BH CRISIS PLAN
Client Name: Mariza Alford       Client YOB: 2005    Leta Safety Plan      Creation Date: 4/22/24 Update Date: 4/22/24   Created By: Inna Monet PA-C Last Updated By: Inna Monet PA-C      Step 1: Warning Signs:   Warning Signs   Stomach aches            Step 2: Internal Coping Strategies:   Internal Coping Strategies   Sleeping   Watch TV   Listen to music            Step 3: People and social settings that provide distraction:   Name Contact Information   Friends In cell phone   My mom In cell phone    Places   My room           Step 4: People whom I can ask for help during a crisis:      Name Contact Information    My mom In cell phone      Step 5: Professionals or agencies I can contact during a crisis:      Clinican/Agency Name Phone Emergency Contact    Inna Monet PA-C In cell phone         Crisis Phone Numbers:   Suicide Prevention Lifeline: Call or Text  935 Crisis Text Line: Text HOME to 997-090   Please note: Some [facility] do not have a separate number for Child/Adolescent specific crisis. If your county is not listed under Child/Adolescent, please call the adult number for your county      Adult Crisis Numbers: Child/Adolescent Crisis Numbers   [facility]: [phone] [facility]: [phone]   [facility]: [phone] [facility]: [phone]   [facility]: [phone] [place]: [phone]   [facility]: [phone] [place]: [phone]   [facility]: [phone]   [facility]: [phone]   [facility]: [phone]   [facility]: [phone] (daytime) [phone] (after hours, weekends, holidays)      Step 6: Making the environment safer (plan for lethal means safety):   Patient did not identify any lethal methods: Yes     Optional: What is most important to me and worth living for?      Leta Safety Plan. Cadence Coppola and Sivakumar Ferguson. Used with  permission of the authors.

## 2024-05-07 DIAGNOSIS — K58.2 IRRITABLE BOWEL SYNDROME WITH BOTH CONSTIPATION AND DIARRHEA: ICD-10-CM

## 2024-05-07 RX ORDER — LINACLOTIDE 290 UG/1
CAPSULE, GELATIN COATED ORAL
Qty: 30 CAPSULE | Refills: 2 | Status: SHIPPED | OUTPATIENT
Start: 2024-05-07

## 2024-06-24 DIAGNOSIS — F41.1 GENERALIZED ANXIETY DISORDER: ICD-10-CM

## 2024-06-24 NOTE — TELEPHONE ENCOUNTER
Medication Refill Request     Per patient must be 90 days supply for insurance.    Name of Medication Celexa 20 mg  Dose/Frequency 1qd  Quantity 90  Verified pharmacy   [x]  Verified ordering Provider   [x]  Does patient have enough for the next 3 days? Yes [] No [x]  Does patient have a follow-up appointment scheduled? Yes [x] No []   If so when is appointment: 7/29/2024

## 2024-06-25 RX ORDER — CITALOPRAM 20 MG/1
20 TABLET ORAL DAILY
Qty: 90 TABLET | Refills: 0 | Status: SHIPPED | OUTPATIENT
Start: 2024-06-25 | End: 2024-09-23

## 2024-07-01 ENCOUNTER — TELEPHONE (OUTPATIENT)
Dept: PSYCHIATRY | Facility: CLINIC | Age: 19
End: 2024-07-01

## 2024-07-01 NOTE — TELEPHONE ENCOUNTER
IC called pt from talk therapy wait list and left voice message to verify if pt is to remain on wait list and to get scheduling preferences for appt days and times.   Hydroxyzine Pregnancy And Lactation Text: This medication is not safe during pregnancy and should not be taken. It is also excreted in breast milk and breast feeding isn't recommended.

## 2024-07-08 NOTE — TELEPHONE ENCOUNTER
IC called pt from talk therapy wait list and left voice message about scheduling an appt with Marcia Basurto in the [facility] office.

## 2024-07-10 NOTE — TELEPHONE ENCOUNTER
Pt has not responded to 2 voice messages left about talk therapy, so pt is removed from wait list.

## 2024-07-22 DIAGNOSIS — K58.2 IRRITABLE BOWEL SYNDROME WITH BOTH CONSTIPATION AND DIARRHEA: ICD-10-CM

## 2024-07-23 RX ORDER — LINACLOTIDE 290 UG/1
CAPSULE, GELATIN COATED ORAL
Qty: 30 CAPSULE | Refills: 5 | Status: SHIPPED | OUTPATIENT
Start: 2024-07-23

## 2024-07-29 ENCOUNTER — TELEMEDICINE (OUTPATIENT)
Dept: PSYCHIATRY | Facility: CLINIC | Age: 19
End: 2024-07-29
Payer: COMMERCIAL

## 2024-07-29 ENCOUNTER — TELEPHONE (OUTPATIENT)
Dept: PSYCHIATRY | Facility: CLINIC | Age: 19
End: 2024-07-29

## 2024-07-29 DIAGNOSIS — F41.1 GENERALIZED ANXIETY DISORDER: Primary | ICD-10-CM

## 2024-07-29 PROCEDURE — 99213 OFFICE O/P EST LOW 20 MIN: CPT | Performed by: PHYSICIAN ASSISTANT

## 2024-07-29 RX ORDER — CITALOPRAM 40 MG/1
40 TABLET ORAL DAILY
Qty: 30 TABLET | Refills: 2 | Status: SHIPPED | OUTPATIENT
Start: 2024-07-29 | End: 2024-10-27

## 2024-07-29 NOTE — PSYCH
"This note was not shared with the patient due to reasonable likelihood of causing patient harm     Virtual Regular Visit    Problem List Items Addressed This Visit       Generalized anxiety disorder - Primary    Relevant Medications    citalopram (CeleXA) 40 mg tablet     Reason for visit is   Chief Complaint   Patient presents with    Medication Management    Follow-up     Encounter provider Inna Monet PA-C    Provider located at [address]  [address]  [facility] [id number] [phone]    Recent Visits  No visits were found meeting these conditions.  Showing recent visits within past 7 days and meeting all other requirements  Today's Visits  Date Type Provider Dept   07/29/24 Telephone Inna Monet PA-C [facility]   07/29/24 Telemedicine Inna Monet PA-C [facility]   Showing today's visits and meeting all other requirements  Future Appointments  No visits were found meeting these conditions.  Showing future appointments within next 150 days and meeting all other requirements       After connecting through [facility], the patient was identified by name and date of birth. Asia Alford was informed that this is a telemedicine visit and that the visit is being conducted through the Epic Embedded platform. She agrees to proceed. which may not be secure and therefore, might not be HIPAA-compliant.  My office door was closed. No one else was in the room.  She acknowledged consent and understanding of privacy and security of the video platform. The patient has agreed to participate and understands they can discontinue the visit at any time.    SUBJECTIVE:    Asia Alford is a 19 y.o. female with a history of anxiety who presents for virtual appointment today.  She reports that she has been \"pretty good.\"  She does notice that her medication is not working as well as it " initially did.  She has noticed some more anxiety.  She has been going to [place] to visit her grandmother and aunt.  They are going to be moving into her home for a period of time until they find her own place.  She reports she is little bit nervous about this because it is going to be a lot of people and 1 small space.  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  She is going to a concert in [facility] later this month.  She is visiting [place] in October.  She reports that she has had some difficulty falling asleep but when she falls asleep she sleeps well.  She is interested in increasing her Celexa today.    She is getting adequate nutrition.    No suicidal or homicidal thought, plan, or intent.    No auditory or visual hallucinations.  No delusions.    HPI ROS Appetite Changes and Sleep: normal appetite trouble falling asleep    Review Of Systems:     Constitutional Negative   ENT Negative   Cardiovascular Negative   Respiratory Negative   Gastrointestinal As noted in HPI   Genitourinary Negative   Musculoskeletal Negative   Integumentary Negative   Neurological Negative   Endocrine Negative   Other Symptoms Negative and None          Substance Abuse History:    Social History     Substance and Sexual Activity   Drug Use Never       Family Psychiatric History:     Family History   Problem Relation Age of Onset    No Known Problems Mother     Heart attack Father     Early death Father     Heart disease Father     Breast cancer Maternal Grandmother     Diabetes Maternal Grandmother     Hypertension Maternal Grandmother     Cancer Maternal Grandfather         Lung    Heart disease Maternal Grandfather     Depression Paternal Grandmother     Diabetes Paternal Grandmother     Heart disease Paternal Grandmother     Hypertension Paternal Grandmother     Mental illness Paternal Grandmother     Heart disease Paternal Grandfather     Stroke Paternal Grandfather     No Known Problems Sister     No  Known Problems Brother     No Known Problems Maternal Aunt     No Known Problems Maternal Uncle     No Known Problems Paternal Aunt     No Known Problems Paternal Uncle        Social History     Socioeconomic History    Marital status: Single     Spouse name: Not on file    Number of children: Not on file    Years of education: Not on file    Highest education level: Not on file   Occupational History    Not on file   Tobacco Use    Smoking status: Never    Smokeless tobacco: Never    Tobacco comments:     former vapor   Vaping Use    Vaping status: Every Day    Substances: Nicotine, Flavoring   Substance and Sexual Activity    Alcohol use: Never    Drug use: Never    Sexual activity: Never   Other Topics Concern    Not on file   Social History Narrative    ** Merged History Encounter **          Social Determinants of Health     Financial Resource Strain: Not on file   Food Insecurity: Not on file   Transportation Needs: Not on file   Physical Activity: Not on file   Stress: Not on file   Social Connections: Not on file   Intimate Partner Violence: Not on file   Housing Stability: Not on file       Past Medical History:   Diagnosis Date    Encopresis 6/10/2021       History reviewed. No pertinent surgical history.    Current Outpatient Medications   Medication Sig Dispense Refill    citalopram (CeleXA) 40 mg tablet Take 1 tablet (40 mg total) by mouth daily 30 tablet 2    benzonatate (TESSALON) 200 MG capsule Take 1 capsule (200 mg total) by mouth 3 (three) times a day as needed for cough 20 capsule 0    famotidine (PEPCID) 20 mg tablet TAKE 1 TABLET BY MOUTH TWICE A  tablet 0    hydrOXYzine HCL (ATARAX) 10 mg tablet Take 1 tablet (10 mg total) by mouth daily as needed for anxiety 10 tablet 0    linaCLOtide (Linzess) 290 MCG CAPS TAKE 1 CAPSULE BY MOUTH EVERY DAY IN THE MORNING 30 capsule 5    phenazopyridine (PYRIDIUM) 200 mg tablet Take 1 tablet (200 mg total) by mouth 3 (three) times a day with meals 10  tablet 0    phenol (CHLORASEPTIC) 1.4 % mucosal liquid Apply 1 spray to the mouth or throat every 2 (two) hours as needed (Sore throat) 20 mL 0    Trulance 3 MG TABS TAKE 1 TABLET BY MOUTH EVERY DAY 30 tablet 35    Vienva 0.1-20 MG-MCG per tablet Take 1 tablet by mouth daily Take at the same time       No current facility-administered medications for this visit.        No Known Allergies    The following portions of the patient's history were reviewed and updated as appropriate: allergies, current medications, past family history, past medical history, past social history, past surgical history, and problem list.    OBJECTIVE:     Mental Status Examination:    Appearance age appropriate, casually dressed   Behavior pleasant, cooperative   Speech normal volume, normal pitch   Mood Slightly anxious   Affect Mood congruent   Thought Processes logical   Associations intact associations   Thought Content normal   Perceptual Disturbances: none   Abnormal Thoughts  Risk Potential Suicidal ideation - None  Homicidal ideation - None  Potential for aggression - No   Orientation oriented to person, place, time/date and situation   Memory recent and remote memory grossly intact   Cosciousness alert and awake   Attention Span attention span and concentration are age appropriate   Intellect Appears to be of Average Intelligence   Insight age appropriate    Judgement good    Muscle Strength and  Gait muscle strength and tone were normal   Language no difficulty naming common objects   Fund of Knowledge displays adequate knowledge of current events   Pain none   Pain Scale 0        Laboratory Results: No results found for this or any previous visit.    Assessment/Plan:       Diagnoses and all orders for this visit:    Generalized anxiety disorder  -     citalopram (CeleXA) 40 mg tablet; Take 1 tablet (40 mg total) by mouth daily          Treatment Recommendations- Risks Benefits      Will increase Celexa to 40 mg daily for  anxiety      Continue the following medication:    Atarax 10 mg daily as needed for anxiety    We will follow-up in 1-2 months or sooner if questions or concerns arise.  She is aware of emergent versus nonemergent mental health resources.  She is able to contract for her own safety at this time.    Risks, Benefits And Possible Side Effects Of Medications:  discussed    Controlled Medication Discussion: not applicable     Psychotherapy Provided: no    Treatment Plan:    Completed and signed during the session: Not applicable - Treatment Plan not due at this session    I spent 21 minutes with the patient during this visit.    This note was completed in part utilizing Dragon dictation Software. Grammatical, translation, syntax errors, random word insertions, spelling mistakes, and incomplete sentences may be an occasional consequence of this system secondary to software limitations with voice recognition, ambient noise, and hardware issues. If you have any questions or concerns about the content, text, or information contained within the body of this dictation, please contact the provider for clarification.     Inna Monet PA-C 07/29/24

## 2024-07-29 NOTE — TELEPHONE ENCOUNTER
Called patient but had to leave voice mail message requesting call back to schedule 6 week follow up appointment from today with Inna Monet PA-C. Also left message patient must sign virtual consent form in documents (sent via my chart) in order to keep her virtual appointments. Awaiting Call Back.

## 2024-08-28 DIAGNOSIS — F41.1 GENERALIZED ANXIETY DISORDER: ICD-10-CM

## 2024-08-28 RX ORDER — CITALOPRAM HYDROBROMIDE 40 MG/1
40 TABLET ORAL DAILY
Qty: 90 TABLET | Refills: 1 | Status: SHIPPED | OUTPATIENT
Start: 2024-08-28

## 2024-09-21 DIAGNOSIS — K21.9 GASTROESOPHAGEAL REFLUX DISEASE WITHOUT ESOPHAGITIS: ICD-10-CM

## 2024-09-23 RX ORDER — FAMOTIDINE 20 MG/1
TABLET, FILM COATED ORAL
Qty: 180 TABLET | Refills: 0 | Status: SHIPPED | OUTPATIENT
Start: 2024-09-23

## 2024-09-30 DIAGNOSIS — K58.2 IRRITABLE BOWEL SYNDROME WITH BOTH CONSTIPATION AND DIARRHEA: ICD-10-CM

## 2024-09-30 DIAGNOSIS — R10.31 RIGHT LOWER QUADRANT ABDOMINAL PAIN: ICD-10-CM

## 2024-09-30 DIAGNOSIS — R14.0 ABDOMINAL BLOATING: ICD-10-CM

## 2024-10-01 RX ORDER — PLECANATIDE 3 MG/1
1 TABLET ORAL DAILY
Qty: 30 TABLET | Refills: 35 | Status: SHIPPED | OUTPATIENT
Start: 2024-10-01

## 2024-11-06 ENCOUNTER — TELEPHONE (OUTPATIENT)
Age: 19
End: 2024-11-06

## 2024-11-06 NOTE — TELEPHONE ENCOUNTER
PA for TRULANCE SUBMITTED to [facility]    via    [x]CMM-KEY: [id number]  []Surescripts-Case ID #   []Availity-Auth ID # NDC #   []Faxed to plan   []Other website   []Phone call Case ID #     All office notes, labs and other pertaining documents and studies sent. Clinical questions answered. Awaiting determination from insurance company.     All Prior Authorizations are sent as Urgent to all insurance companies, however Prior Authorizations can take anywhere from 7-21 days for a response from your insurance company. You can gladly reach out to your insurance company to help expedite this process.

## 2024-11-08 NOTE — TELEPHONE ENCOUNTER
PA for TRULANCE APPROVED     Date(s) approved 11/6/24-11/6/25    Case #    Patient advised by          []Trendlrhart Message  []Phone call   [x]LMOM  []L/M to call office as no active Communication consent on file  []Unable to leave detailed message as VM not approved on Communication consent       Pharmacy advised by    [x]Fax  []Phone call    Approval letter scanned into Media Yes

## 2024-12-19 DIAGNOSIS — K21.9 GASTROESOPHAGEAL REFLUX DISEASE WITHOUT ESOPHAGITIS: ICD-10-CM

## 2024-12-19 RX ORDER — FAMOTIDINE 20 MG/1
20 TABLET, FILM COATED ORAL 2 TIMES DAILY
Qty: 180 TABLET | Refills: 1 | Status: SHIPPED | OUTPATIENT
Start: 2024-12-19

## 2025-01-30 ENCOUNTER — TELEPHONE (OUTPATIENT)
Age: 20
End: 2025-01-30

## 2025-01-30 NOTE — TELEPHONE ENCOUNTER
Patient called in to schedule a f/u MM appt.     Patient is now scheduled for 1/31 @10:30am virtually

## 2025-02-06 DIAGNOSIS — F41.1 GENERALIZED ANXIETY DISORDER: ICD-10-CM

## 2025-02-06 DIAGNOSIS — K58.2 IRRITABLE BOWEL SYNDROME WITH BOTH CONSTIPATION AND DIARRHEA: ICD-10-CM

## 2025-02-06 RX ORDER — LINACLOTIDE 290 UG/1
CAPSULE, GELATIN COATED ORAL
Qty: 30 CAPSULE | Refills: 5 | Status: SHIPPED | OUTPATIENT
Start: 2025-02-06

## 2025-02-06 RX ORDER — CITALOPRAM HYDROBROMIDE 40 MG/1
40 TABLET ORAL DAILY
Qty: 30 TABLET | Refills: 0 | Status: SHIPPED | OUTPATIENT
Start: 2025-02-06

## 2025-02-28 DIAGNOSIS — F41.1 GENERALIZED ANXIETY DISORDER: ICD-10-CM

## 2025-03-04 RX ORDER — CITALOPRAM HYDROBROMIDE 40 MG/1
40 TABLET ORAL DAILY
Qty: 90 TABLET | Refills: 1 | OUTPATIENT
Start: 2025-03-04

## 2025-03-05 ENCOUNTER — TELEPHONE (OUTPATIENT)
Age: 20
End: 2025-03-05

## 2025-03-05 ENCOUNTER — TELEMEDICINE (OUTPATIENT)
Age: 20
End: 2025-03-05

## 2025-03-05 DIAGNOSIS — K58.1 IRRITABLE BOWEL SYNDROME WITH CONSTIPATION: ICD-10-CM

## 2025-03-05 DIAGNOSIS — R14.0 ABDOMINAL BLOATING: ICD-10-CM

## 2025-03-05 DIAGNOSIS — K21.9 GASTROESOPHAGEAL REFLUX DISEASE, UNSPECIFIED WHETHER ESOPHAGITIS PRESENT: ICD-10-CM

## 2025-03-05 DIAGNOSIS — K58.2 IRRITABLE BOWEL SYNDROME WITH BOTH CONSTIPATION AND DIARRHEA: Primary | ICD-10-CM

## 2025-03-05 DIAGNOSIS — R10.31 RIGHT LOWER QUADRANT ABDOMINAL PAIN: ICD-10-CM

## 2025-03-05 RX ORDER — PRUCALOPRIDE 2 MG/1
2 TABLET ORAL DAILY
Qty: 30 TABLET | Refills: 5 | Status: SHIPPED | OUTPATIENT
Start: 2025-03-05 | End: 2025-03-10 | Stop reason: SDUPTHER

## 2025-03-05 RX ORDER — PLECANATIDE 3 MG/1
1 TABLET ORAL DAILY
Qty: 90 TABLET | Refills: 3 | Status: SHIPPED | OUTPATIENT
Start: 2025-03-05 | End: 2025-03-10 | Stop reason: SDUPTHER

## 2025-03-05 RX ORDER — LINACLOTIDE 290 UG/1
290 CAPSULE, GELATIN COATED ORAL DAILY
Qty: 90 CAPSULE | Refills: 3 | Status: SHIPPED | OUTPATIENT
Start: 2025-03-05 | End: 2025-03-10 | Stop reason: SDUPTHER

## 2025-03-05 NOTE — LETTER
2025     TALITA Sanchez  [address] [facility] [address]    Patient: Asia Alford   YOB: 2005   Date of Visit: 3/5/2025       Dear Dr. Woods:    Thank you for referring Asia Alford to me for evaluation. Below are my notes for this consultation.    If you have questions, please do not hesitate to call me. I look forward to following your patient along with you.         Sincerely,        Marcus Salcido PA-C        CC: No Recipients    Marcus Salcido PA-C  3/5/2025  3:08 PM  Incomplete  Name: Asia Alford      : 2005      MRN: [id number]  Encounter Provider: Marcus Salcido PA-C  Encounter Date: 3/5/2025   Encounter department: [facility]  :  Assessment & Plan  Irritable bowel syndrome with both constipation and diarrhea  -Mostly constipation  -Despite using both Linzess 290 mcg and Trulance 3 mg she was still not going every day having to push and not feeling like bowel movements complete  -Continue Linzess and Trulance  -Add Motegrity  -Encourage good daily fluids and fiber       Gastroesophageal reflux disease, unspecified whether esophagitis present  -Previously on famotidine 20 mg twice daily  -Currently stable on diet  Also recommend:  - avoid NSAIDS  - avoid eating within 3 hours of sleeping  - elevated head of bed 4-6 inches while sleeping  - avoid trigger foods  - recommend daily supply of calcium and vitamin D         Right lower quadrant abdominal pain         Abdominal bloating             History of Present Illness  Asia Alford is a 20 y.o. female new to me with past medical history of irritable bowel mainly constipation, generalized anxiety who presents for follow-up and med refill.    She was last seen in the office in 2024 with Dr. Valdes for irritable bowel.  She had previous therapeutic failures on Linzess all doses and Trulance.  Labs were ordered including  TSH, fecal calprotectin, iron, chronic hepatitis panel, CBC, CMP and celiac but not done.    Despite taking Linzess and Trulance daily she still not going every day and having to push in bowel movements do not feel complete.  No black or bloody stools.    HPI  History obtained from: patient  Review of Systems   Constitutional:  Negative for activity change, appetite change, chills, diaphoresis, fatigue and unexpected weight change.   HENT:  Negative for mouth sores, sore throat and trouble swallowing.    Eyes:  Negative for pain, redness and visual disturbance.   Respiratory:  Negative for apnea, chest tightness and shortness of breath.    Cardiovascular:  Negative for chest pain and leg swelling.   Gastrointestinal:  Positive for constipation. Negative for abdominal distention, abdominal pain, anal bleeding, blood in stool, diarrhea, nausea and vomiting.   Genitourinary:  Negative for difficulty urinating, dysuria and hematuria.   Musculoskeletal:  Negative for arthralgias, back pain, gait problem, joint swelling and myalgias.   Skin:  Negative for color change, pallor and rash.   Allergic/Immunologic: Negative for environmental allergies and food allergies.   Neurological:  Negative for dizziness, weakness, light-headedness, numbness and headaches.   Psychiatric/Behavioral:  Negative for agitation and behavioral problems.     A complete review of systems is negative other than that noted above in the HPI.    Past Medical History  Past Medical History:   Diagnosis Date   • Encopresis 6/10/2021     No past surgical history on file.  Family History   Problem Relation Age of Onset   • No Known Problems Mother    • Heart attack Father    • Early death Father    • Heart disease Father    • Breast cancer Maternal Grandmother    • Diabetes Maternal Grandmother    • Hypertension Maternal Grandmother    • Cancer Maternal Grandfather         Lung   • Heart disease Maternal Grandfather    • Depression Paternal Grandmother     • Diabetes Paternal Grandmother    • Heart disease Paternal Grandmother    • Hypertension Paternal Grandmother    • Mental illness Paternal Grandmother    • Heart disease Paternal Grandfather    • Stroke Paternal Grandfather    • No Known Problems Sister    • No Known Problems Brother    • No Known Problems Maternal Aunt    • No Known Problems Maternal Uncle    • No Known Problems Paternal Aunt    • No Known Problems Paternal Uncle       reports that she has never smoked. She has never used smokeless tobacco. She reports that she does not drink alcohol and does not use drugs.  Current Outpatient Medications   Medication Instructions   • benzonatate (TESSALON) 200 mg, Oral, 3 times daily PRN   • citalopram (CELEXA) 40 mg, Oral, Daily   • famotidine (PEPCID) 20 mg, Oral, 2 times daily   • hydrOXYzine HCL (ATARAX) 10 mg, Oral, Daily PRN   • Linzess 290 MCG CAPS TAKE 1 CAPSULE BY MOUTH EVERY DAY IN THE MORNING   • phenazopyridine (PYRIDIUM) 200 mg, Oral, 3 times daily with meals   • phenol (CHLORASEPTIC) 1.4 % mucosal liquid 1 spray, Mouth/Throat, Every 2 hour PRN   • Trulance 3 MG TABS 1 tablet, Oral, Daily   • Vienva 0.1-20 MG-MCG per tablet 1 tablet, Oral, Daily, Take at the same time   No Known Allergies   Current Outpatient Medications on File Prior to Visit   Medication Sig Dispense Refill   • benzonatate (TESSALON) 200 MG capsule Take 1 capsule (200 mg total) by mouth 3 (three) times a day as needed for cough 20 capsule 0   • citalopram (CeleXA) 40 mg tablet TAKE 1 TABLET BY MOUTH EVERY DAY 30 tablet 0   • famotidine (PEPCID) 20 mg tablet TAKE 1 TABLET BY MOUTH TWICE A  tablet 1   • hydrOXYzine HCL (ATARAX) 10 mg tablet Take 1 tablet (10 mg total) by mouth daily as needed for anxiety 10 tablet 0   • Linzess 290 MCG CAPS TAKE 1 CAPSULE BY MOUTH EVERY DAY IN THE MORNING 30 capsule 5   • phenazopyridine (PYRIDIUM) 200 mg tablet Take 1 tablet (200 mg total) by mouth 3 (three) times a day with meals 10 tablet  0   • phenol (CHLORASEPTIC) 1.4 % mucosal liquid Apply 1 spray to the mouth or throat every 2 (two) hours as needed (Sore throat) 20 mL 0   • Trulance 3 MG TABS TAKE 1 TABLET BY MOUTH EVERY DAY 30 tablet 35   • Vienva 0.1-20 MG-MCG per tablet Take 1 tablet by mouth daily Take at the same time       No current facility-administered medications on file prior to visit.      Social History     Tobacco Use   • Smoking status: Never   • Smokeless tobacco: Never   • Tobacco comments:     former vapor   Vaping Use   • Vaping status: Every Day   • Substances: Nicotine, Flavoring   Substance and Sexual Activity   • Alcohol use: Never   • Drug use: Never   • Sexual activity: Never     Current Outpatient Medications   Medication Sig Dispense Refill   • benzonatate (TESSALON) 200 MG capsule Take 1 capsule (200 mg total) by mouth 3 (three) times a day as needed for cough 20 capsule 0   • citalopram (CeleXA) 40 mg tablet TAKE 1 TABLET BY MOUTH EVERY DAY 30 tablet 0   • famotidine (PEPCID) 20 mg tablet TAKE 1 TABLET BY MOUTH TWICE A  tablet 1   • hydrOXYzine HCL (ATARAX) 10 mg tablet Take 1 tablet (10 mg total) by mouth daily as needed for anxiety 10 tablet 0   • Linzess 290 MCG CAPS TAKE 1 CAPSULE BY MOUTH EVERY DAY IN THE MORNING 30 capsule 5   • phenazopyridine (PYRIDIUM) 200 mg tablet Take 1 tablet (200 mg total) by mouth 3 (three) times a day with meals 10 tablet 0   • phenol (CHLORASEPTIC) 1.4 % mucosal liquid Apply 1 spray to the mouth or throat every 2 (two) hours as needed (Sore throat) 20 mL 0   • Trulance 3 MG TABS TAKE 1 TABLET BY MOUTH EVERY DAY 30 tablet 35   • Vienva 0.1-20 MG-MCG per tablet Take 1 tablet by mouth daily Take at the same time       No current facility-administered medications for this visit.     Objective  There were no vitals taken for this visit.    Physical Exam  Vitals reviewed.   Constitutional:       General: She is not in acute distress.     Appearance: Normal appearance. She is not  ill-appearing.   HENT:      Head: Normocephalic and atraumatic.   Eyes:      General: No scleral icterus.     Conjunctiva/sclera: Conjunctivae normal.   Pulmonary:      Effort: Pulmonary effort is normal. No respiratory distress.   Neurological:      Mental Status: She is alert and oriented to person, place, and time.   Psychiatric:         Mood and Affect: Mood normal.         Behavior: Behavior normal.            Lab Results: I personally reviewed relevant lab results. CBC/BMP: No new results in last 24 hours. , LFTs: No new results in last 24 hours. , PTT/INR:No new results in last 24 hours.     Radiology Results Review : No pertinent imaging studies reviewed.          Marcus Salcido PA-C  [facility] - Gastroentrology      Marcus Salcido PA-C  3/5/2025  2:58 PM  Sign when Signing Visit  Name: Asia Alford      : 2005      MRN: [id number]  Encounter Provider: Marcus Salcido PA-C  Encounter Date: 3/5/2025   Encounter department: [facility]  :  Assessment & Plan  Irritable bowel syndrome with both constipation and diarrhea  -Mostly constipation  -Previous failures with all doses of Linzess and Trulance  -Courage good daily fluids and fiber       Gastroesophageal reflux disease, unspecified whether esophagitis present  -Previously on famotidine 20 mg twice daily  -Currently stable on diet  Also recommend:  - avoid NSAIDS  - avoid eating within 3 hours of sleeping  - elevated head of bed 4-6 inches while sleeping  - avoid trigger foods  - recommend daily supply of calcium and vitamin D             History of Present Illness  Asia Alford is a 20 y.o. female new to me with past medical history of irritable bowel mainly constipation, generalized anxiety who presents for follow-up and med refill.    She was last seen in the office in 2024 with Dr. Valdes for irritable bowel.  She had previous therapeutic failures  on Linzess all doses and Trulance.  Labs were ordered including TSH, fecal calprotectin, iron, chronic hepatitis panel, CBC, CMP and celiac but not done.    HPI  History obtained from: patient  Review of Systems   Constitutional:  Negative for activity change, appetite change, chills, diaphoresis, fatigue and unexpected weight change.   HENT:  Negative for mouth sores, sore throat and trouble swallowing.    Eyes:  Negative for pain, redness and visual disturbance.   Respiratory:  Negative for apnea, chest tightness and shortness of breath.    Cardiovascular:  Negative for chest pain and leg swelling.   Gastrointestinal:  Negative for abdominal distention, abdominal pain, anal bleeding, blood in stool, constipation, diarrhea, nausea and vomiting.   Genitourinary:  Negative for difficulty urinating, dysuria and hematuria.   Musculoskeletal:  Negative for arthralgias, back pain, gait problem, joint swelling and myalgias.   Skin:  Negative for color change, pallor and rash.   Allergic/Immunologic: Negative for environmental allergies and food allergies.   Neurological:  Negative for dizziness, weakness, light-headedness, numbness and headaches.   Psychiatric/Behavioral:  Negative for agitation and behavioral problems.     A complete review of systems is negative other than that noted above in the HPI.    Past Medical History  Past Medical History:   Diagnosis Date   • Encopresis 6/10/2021     No past surgical history on file.  Family History   Problem Relation Age of Onset   • No Known Problems Mother    • Heart attack Father    • Early death Father    • Heart disease Father    • Breast cancer Maternal Grandmother    • Diabetes Maternal Grandmother    • Hypertension Maternal Grandmother    • Cancer Maternal Grandfather         Lung   • Heart disease Maternal Grandfather    • Depression Paternal Grandmother    • Diabetes Paternal Grandmother    • Heart disease Paternal Grandmother    • Hypertension Paternal Grandmother     • Mental illness Paternal Grandmother    • Heart disease Paternal Grandfather    • Stroke Paternal Grandfather    • No Known Problems Sister    • No Known Problems Brother    • No Known Problems Maternal Aunt    • No Known Problems Maternal Uncle    • No Known Problems Paternal Aunt    • No Known Problems Paternal Uncle       reports that she has never smoked. She has never used smokeless tobacco. She reports that she does not drink alcohol and does not use drugs.  Current Outpatient Medications   Medication Instructions   • benzonatate (TESSALON) 200 mg, Oral, 3 times daily PRN   • citalopram (CELEXA) 40 mg, Oral, Daily   • famotidine (PEPCID) 20 mg, Oral, 2 times daily   • hydrOXYzine HCL (ATARAX) 10 mg, Oral, Daily PRN   • Linzess 290 MCG CAPS TAKE 1 CAPSULE BY MOUTH EVERY DAY IN THE MORNING   • phenazopyridine (PYRIDIUM) 200 mg, Oral, 3 times daily with meals   • phenol (CHLORASEPTIC) 1.4 % mucosal liquid 1 spray, Mouth/Throat, Every 2 hour PRN   • Trulance 3 MG TABS 1 tablet, Oral, Daily   • Vienva 0.1-20 MG-MCG per tablet 1 tablet, Oral, Daily, Take at the same time   No Known Allergies  Current Outpatient Medications on File Prior to Visit   Medication Sig Dispense Refill   • benzonatate (TESSALON) 200 MG capsule Take 1 capsule (200 mg total) by mouth 3 (three) times a day as needed for cough 20 capsule 0   • citalopram (CeleXA) 40 mg tablet TAKE 1 TABLET BY MOUTH EVERY DAY 30 tablet 0   • famotidine (PEPCID) 20 mg tablet TAKE 1 TABLET BY MOUTH TWICE A  tablet 1   • hydrOXYzine HCL (ATARAX) 10 mg tablet Take 1 tablet (10 mg total) by mouth daily as needed for anxiety 10 tablet 0   • Linzess 290 MCG CAPS TAKE 1 CAPSULE BY MOUTH EVERY DAY IN THE MORNING 30 capsule 5   • phenazopyridine (PYRIDIUM) 200 mg tablet Take 1 tablet (200 mg total) by mouth 3 (three) times a day with meals 10 tablet 0   • phenol (CHLORASEPTIC) 1.4 % mucosal liquid Apply 1 spray to the mouth or throat every 2 (two) hours as  needed (Sore throat) 20 mL 0   • Trulance 3 MG TABS TAKE 1 TABLET BY MOUTH EVERY DAY 30 tablet 35   • Vienva 0.1-20 MG-MCG per tablet Take 1 tablet by mouth daily Take at the same time       No current facility-administered medications on file prior to visit.      Social History     Tobacco Use   • Smoking status: Never   • Smokeless tobacco: Never   • Tobacco comments:     former vapor   Vaping Use   • Vaping status: Every Day   • Substances: Nicotine, Flavoring   Substance and Sexual Activity   • Alcohol use: Never   • Drug use: Never   • Sexual activity: Never     Current Outpatient Medications   Medication Sig Dispense Refill   • benzonatate (TESSALON) 200 MG capsule Take 1 capsule (200 mg total) by mouth 3 (three) times a day as needed for cough 20 capsule 0   • citalopram (CeleXA) 40 mg tablet TAKE 1 TABLET BY MOUTH EVERY DAY 30 tablet 0   • famotidine (PEPCID) 20 mg tablet TAKE 1 TABLET BY MOUTH TWICE A  tablet 1   • hydrOXYzine HCL (ATARAX) 10 mg tablet Take 1 tablet (10 mg total) by mouth daily as needed for anxiety 10 tablet 0   • Linzess 290 MCG CAPS TAKE 1 CAPSULE BY MOUTH EVERY DAY IN THE MORNING 30 capsule 5   • phenazopyridine (PYRIDIUM) 200 mg tablet Take 1 tablet (200 mg total) by mouth 3 (three) times a day with meals 10 tablet 0   • phenol (CHLORASEPTIC) 1.4 % mucosal liquid Apply 1 spray to the mouth or throat every 2 (two) hours as needed (Sore throat) 20 mL 0   • Trulance 3 MG TABS TAKE 1 TABLET BY MOUTH EVERY DAY 30 tablet 35   • Vienva 0.1-20 MG-MCG per tablet Take 1 tablet by mouth daily Take at the same time       No current facility-administered medications for this visit.     Objective  There were no vitals taken for this visit.    Physical Exam  Vitals reviewed.   Constitutional:       General: She is not in acute distress.     Appearance: Normal appearance. She is not ill-appearing.   HENT:      Head: Normocephalic and atraumatic.   Eyes:      General: No scleral icterus.      Conjunctiva/sclera: Conjunctivae normal.   Cardiovascular:      Rate and Rhythm: Normal rate and regular rhythm.   Pulmonary:      Effort: Pulmonary effort is normal. No respiratory distress.      Breath sounds: Normal breath sounds.   Abdominal:      General: Bowel sounds are normal. There is no distension.      Palpations: Abdomen is soft.      Tenderness: There is no abdominal tenderness. There is no guarding.   Skin:     General: Skin is warm and dry.   Neurological:      Mental Status: She is alert and oriented to person, place, and time.   Psychiatric:         Mood and Affect: Mood normal.         Behavior: Behavior normal.            Lab Results: I personally reviewed relevant lab results. CBC/BMP: No new results in last 24 hours. , LFTs: No new results in last 24 hours. , PTT/INR:No new results in last 24 hours.     Radiology Results Review : No pertinent imaging studies reviewed.          Marcus Salcido PA-C  [facility] - Gastroentrology

## 2025-03-05 NOTE — PROGRESS NOTES
Name: Asia Alford      : 2005      MRN: [id number]  Encounter Provider: Marcus Salcido PA-C  Encounter Date: 3/5/2025   Encounter department: [facility]  :    #### visit was done via EPIC Video chat  Assessment & Plan  Irritable bowel syndrome with both constipation and diarrhea  -Mostly constipation  -Despite using both Linzess 290 mcg and Trulance 3 mg she was still not going every day having to push and not feeling like bowel movements complete  -Continue Linzess and Trulance  -Add Motegrity  -Encourage good daily fluids and fiber  Orders:  •  linaCLOtide (Linzess) 290 MCG CAPS; Take 1 capsule by mouth in the morning  •  Plecanatide (Trulance) 3 MG TABS; Take 1 tablet by mouth daily    Gastroesophageal reflux disease, unspecified whether esophagitis present  -Previously on famotidine 20 mg twice daily  -Currently stable on diet  Also recommend:  - avoid NSAIDS  - avoid eating within 3 hours of sleeping  - elevated head of bed 4-6 inches while sleeping  - avoid trigger foods  - recommend daily supply of calcium and vitamin D         Right lower quadrant abdominal pain    Orders:  •  Plecanatide (Trulance) 3 MG TABS; Take 1 tablet by mouth daily    Abdominal bloating    Orders:  •  Plecanatide (Trulance) 3 MG TABS; Take 1 tablet by mouth daily    Irritable bowel syndrome with constipation    Orders:  •  Prucalopride Succinate (Motegrity) 2 MG TABS; Take 2 mg by mouth in the morning        History of Present Illness   Asia Alford is a 20 y.o. female new to me with past medical history of irritable bowel mainly constipation, generalized anxiety who presents for follow-up and med refill.    She was last seen in the office in 2024 with Dr. Valdes for irritable bowel.  She had previous therapeutic failures on Linzess all doses and Trulance.  Labs were ordered including TSH, fecal calprotectin, iron, chronic hepatitis panel, CBC, CMP and celiac but not  done.    Despite taking Linzess and Trulance daily she still not going every day and having to push in bowel movements do not feel complete.  No black or bloody stools.    HPI  History obtained from: patient  Review of Systems   Constitutional:  Negative for activity change, appetite change, chills, diaphoresis, fatigue and unexpected weight change.   HENT:  Negative for mouth sores, sore throat and trouble swallowing.    Eyes:  Negative for pain, redness and visual disturbance.   Respiratory:  Negative for apnea, chest tightness and shortness of breath.    Cardiovascular:  Negative for chest pain and leg swelling.   Gastrointestinal:  Positive for constipation. Negative for abdominal distention, abdominal pain, anal bleeding, blood in stool, diarrhea, nausea and vomiting.   Genitourinary:  Negative for difficulty urinating, dysuria and hematuria.   Musculoskeletal:  Negative for arthralgias, back pain, gait problem, joint swelling and myalgias.   Skin:  Negative for color change, pallor and rash.   Allergic/Immunologic: Negative for environmental allergies and food allergies.   Neurological:  Negative for dizziness, weakness, light-headedness, numbness and headaches.   Psychiatric/Behavioral:  Negative for agitation and behavioral problems.     A complete review of systems is negative other than that noted above in the HPI.    Past Medical History   Past Medical History:   Diagnosis Date   • Encopresis 6/10/2021     History reviewed. No pertinent surgical history.  Family History   Problem Relation Age of Onset   • No Known Problems Mother    • Heart attack Father    • Early death Father    • Heart disease Father    • Breast cancer Maternal Grandmother    • Diabetes Maternal Grandmother    • Hypertension Maternal Grandmother    • Cancer Maternal Grandfather         Lung   • Heart disease Maternal Grandfather    • Depression Paternal Grandmother    • Diabetes Paternal Grandmother    • Heart disease Paternal  Grandmother    • Hypertension Paternal Grandmother    • Mental illness Paternal Grandmother    • Heart disease Paternal Grandfather    • Stroke Paternal Grandfather    • No Known Problems Sister    • No Known Problems Brother    • No Known Problems Maternal Aunt    • No Known Problems Maternal Uncle    • No Known Problems Paternal Aunt    • No Known Problems Paternal Uncle       reports that she has never smoked. She has never used smokeless tobacco. She reports that she does not drink alcohol and does not use drugs.  Current Outpatient Medications   Medication Instructions   • benzonatate (TESSALON) 200 mg, Oral, 3 times daily PRN   • citalopram (CELEXA) 40 mg, Oral, Daily   • famotidine (PEPCID) 20 mg, Oral, 2 times daily   • hydrOXYzine HCL (ATARAX) 10 mg, Oral, Daily PRN   • linaCLOtide (Linzess) 290 MCG CAPS 1 capsule, Oral, Daily   • phenazopyridine (PYRIDIUM) 200 mg, Oral, 3 times daily with meals   • phenol (CHLORASEPTIC) 1.4 % mucosal liquid 1 spray, Mouth/Throat, Every 2 hour PRN   • Plecanatide (Trulance) 3 MG TABS 1 tablet, Oral, Daily   • Prucalopride Succinate (MOTEGRITY) 2 mg, Oral, Daily   • Vienva 0.1-20 MG-MCG per tablet 1 tablet, Oral, Daily, Take at the same time   No Known Allergies   Current Outpatient Medications on File Prior to Visit   Medication Sig Dispense Refill   • benzonatate (TESSALON) 200 MG capsule Take 1 capsule (200 mg total) by mouth 3 (three) times a day as needed for cough 20 capsule 0   • citalopram (CeleXA) 40 mg tablet TAKE 1 TABLET BY MOUTH EVERY DAY 30 tablet 0   • famotidine (PEPCID) 20 mg tablet TAKE 1 TABLET BY MOUTH TWICE A  tablet 1   • hydrOXYzine HCL (ATARAX) 10 mg tablet Take 1 tablet (10 mg total) by mouth daily as needed for anxiety 10 tablet 0   • phenazopyridine (PYRIDIUM) 200 mg tablet Take 1 tablet (200 mg total) by mouth 3 (three) times a day with meals 10 tablet 0   • phenol (CHLORASEPTIC) 1.4 % mucosal liquid Apply 1 spray to the mouth or throat  every 2 (two) hours as needed (Sore throat) 20 mL 0   • Vienva 0.1-20 MG-MCG per tablet Take 1 tablet by mouth daily Take at the same time     • [DISCONTINUED] Linzess 290 MCG CAPS TAKE 1 CAPSULE BY MOUTH EVERY DAY IN THE MORNING 30 capsule 5   • [DISCONTINUED] Trulance 3 MG TABS TAKE 1 TABLET BY MOUTH EVERY DAY 30 tablet 35     No current facility-administered medications on file prior to visit.      Social History     Tobacco Use   • Smoking status: Never   • Smokeless tobacco: Never   • Tobacco comments:     former vapor   Vaping Use   • Vaping status: Every Day   • Substances: Nicotine, Flavoring   Substance and Sexual Activity   • Alcohol use: Never   • Drug use: Never   • Sexual activity: Never     Current Outpatient Medications   Medication Sig Dispense Refill   • linaCLOtide (Linzess) 290 MCG CAPS Take 1 capsule by mouth in the morning 90 capsule 3   • Plecanatide (Trulance) 3 MG TABS Take 1 tablet by mouth daily 90 tablet 3   • Prucalopride Succinate (Motegrity) 2 MG TABS Take 2 mg by mouth in the morning 30 tablet 5   • benzonatate (TESSALON) 200 MG capsule Take 1 capsule (200 mg total) by mouth 3 (three) times a day as needed for cough 20 capsule 0   • citalopram (CeleXA) 40 mg tablet TAKE 1 TABLET BY MOUTH EVERY DAY 30 tablet 0   • famotidine (PEPCID) 20 mg tablet TAKE 1 TABLET BY MOUTH TWICE A  tablet 1   • hydrOXYzine HCL (ATARAX) 10 mg tablet Take 1 tablet (10 mg total) by mouth daily as needed for anxiety 10 tablet 0   • phenazopyridine (PYRIDIUM) 200 mg tablet Take 1 tablet (200 mg total) by mouth 3 (three) times a day with meals 10 tablet 0   • phenol (CHLORASEPTIC) 1.4 % mucosal liquid Apply 1 spray to the mouth or throat every 2 (two) hours as needed (Sore throat) 20 mL 0   • Vienva 0.1-20 MG-MCG per tablet Take 1 tablet by mouth daily Take at the same time       No current facility-administered medications for this visit.     Objective   There were no vitals taken for this  visit.    Physical Exam  Vitals reviewed.   Constitutional:       General: She is not in acute distress.     Appearance: Normal appearance. She is not ill-appearing.   HENT:      Head: Normocephalic and atraumatic.   Eyes:      General: No scleral icterus.     Conjunctiva/sclera: Conjunctivae normal.   Pulmonary:      Effort: Pulmonary effort is normal. No respiratory distress.   Neurological:      Mental Status: She is alert and oriented to person, place, and time.   Psychiatric:         Mood and Affect: Mood normal.         Behavior: Behavior normal.            Lab Results: I personally reviewed relevant lab results. CBC/BMP: No new results in last 24 hours. , LFTs: No new results in last 24 hours. , PTT/INR:No new results in last 24 hours.     Radiology Results Review : No pertinent imaging studies reviewed.          Marcus Salcido PA-C  [facility] - Gastroentrology

## 2025-03-05 NOTE — TELEPHONE ENCOUNTER
Pt had an appt with Marcus lyn who ordered a 6m follow up.  Scheduled an appt for September with Dr Valdes. Pt requests appt to be virtual, I informed pt I would need authorization from provider.

## 2025-03-05 NOTE — ASSESSMENT & PLAN NOTE
-Mostly constipation  -Despite using both Linzess 290 mcg and Trulance 3 mg she was still not going every day having to push and not feeling like bowel movements complete  -Continue Linzess and Trulance  -Add Motegrity  -Encourage good daily fluids and fiber  Orders:  •  linaCLOtide (Linzess) 290 MCG CAPS; Take 1 capsule by mouth in the morning  •  Plecanatide (Trulance) 3 MG TABS; Take 1 tablet by mouth daily

## 2025-03-10 DIAGNOSIS — R14.0 ABDOMINAL BLOATING: ICD-10-CM

## 2025-03-10 DIAGNOSIS — R10.31 RIGHT LOWER QUADRANT ABDOMINAL PAIN: ICD-10-CM

## 2025-03-10 DIAGNOSIS — K21.9 GASTROESOPHAGEAL REFLUX DISEASE WITHOUT ESOPHAGITIS: ICD-10-CM

## 2025-03-10 DIAGNOSIS — K58.1 IRRITABLE BOWEL SYNDROME WITH CONSTIPATION: ICD-10-CM

## 2025-03-10 DIAGNOSIS — K58.2 IRRITABLE BOWEL SYNDROME WITH BOTH CONSTIPATION AND DIARRHEA: ICD-10-CM

## 2025-03-10 RX ORDER — FAMOTIDINE 20 MG/1
20 TABLET, FILM COATED ORAL 2 TIMES DAILY
Qty: 180 TABLET | Refills: 1 | Status: SHIPPED | OUTPATIENT
Start: 2025-03-10

## 2025-03-10 RX ORDER — PRUCALOPRIDE 2 MG/1
2 TABLET ORAL DAILY
Qty: 30 TABLET | Refills: 0 | Status: SHIPPED | OUTPATIENT
Start: 2025-03-10

## 2025-03-10 RX ORDER — PLECANATIDE 3 MG/1
1 TABLET ORAL DAILY
Qty: 90 TABLET | Refills: 0 | Status: SHIPPED | OUTPATIENT
Start: 2025-03-10

## 2025-03-10 RX ORDER — LINACLOTIDE 290 UG/1
290 CAPSULE, GELATIN COATED ORAL DAILY
Qty: 90 CAPSULE | Refills: 1 | Status: SHIPPED | OUTPATIENT
Start: 2025-03-10

## 2025-03-10 NOTE — TELEPHONE ENCOUNTER
Reason for call:   [x] Refill   [] Prior Auth  [x] Other: not a dup - change of pharmacy     Office:   [] PCP/Provider -   [x] Specialty/Provider - yanelis munoz/Marcus Salcido     Medication:   Prucalopride Succinate (Motegrity) 2 MG TABS [[id number]]    linaCLOtide (Linzess) 290 MCG CAPS    Plecanatide (Trulance) 3 MG TABS    famotidine (PEPCID) 20 mg tablet [[id number]]    Dose/Frequency:   Sig: Take 2 mg by mouth in the morning    Sig: Take 1 capsule by mouth in the morning    Sig: Take 1 tablet by mouth daily    Sig: TAKE 1 TABLET BY MOUTH TWICE A DAY    Quantity:   30  90  90  180    Pharmacy:   [facility]/pharmacy #[id number] [place], [place] - [address] [facility] [phone]    Local Pharmacy   Does the patient have enough for 3 days?   [] Yes   [x] No - Send as HP to POD    Mail Away Pharmacy   Does the patient have enough for 10 days?   [] Yes   [] No - Send as HP to POD

## 2025-04-15 DIAGNOSIS — K58.2 IRRITABLE BOWEL SYNDROME WITH BOTH CONSTIPATION AND DIARRHEA: ICD-10-CM

## 2025-04-15 DIAGNOSIS — R10.31 RIGHT LOWER QUADRANT ABDOMINAL PAIN: ICD-10-CM

## 2025-04-15 DIAGNOSIS — R14.0 ABDOMINAL BLOATING: ICD-10-CM

## 2025-04-15 RX ORDER — PLECANATIDE 3 MG/1
1 TABLET ORAL DAILY
Qty: 30 TABLET | Refills: 5 | Status: SHIPPED | OUTPATIENT
Start: 2025-04-15

## 2025-04-18 ENCOUNTER — TELEPHONE (OUTPATIENT)
Age: 20
End: 2025-04-18

## 2025-04-18 NOTE — TELEPHONE ENCOUNTER
Patient wants to know if it is possible to have an increase in her citalopram (CeleXA) 40 mg tablet. Patient has an appt on 5/29/25.

## 2025-04-18 NOTE — TELEPHONE ENCOUNTER
Called Mariza and informed her Inna is out of office until Monday. She is ok waiting for her return, she just doesn't want to have to feel this way until May.

## 2025-04-18 NOTE — TELEPHONE ENCOUNTER
Called Mariza (ok to leave detailed VM)- she said that Celexa has been the best medication that she has been on. She was doing pretty good and still feels like she is, but she noticed that she has been feeling slightly more anxious lately and she thinks that would be solved by a dose increase. Her next appointment is 5/29. Forwarding to provider for review. Clinical will follow up as advised.

## 2025-04-21 NOTE — TELEPHONE ENCOUNTER
Called Mariza and left detailed VM reviewing Inna's message that she is on the highest recommended dose of Celexa and we could add a low dose of buspar 10 mg BID. Possible side effect is dizziness. Advised she have closer follow up appointments moving forward. Requested she call back to let us know if she wants to try buspar.

## 2025-04-29 DIAGNOSIS — F41.1 GENERALIZED ANXIETY DISORDER: Primary | ICD-10-CM

## 2025-04-29 RX ORDER — BUSPIRONE HYDROCHLORIDE 10 MG/1
10 TABLET ORAL 2 TIMES DAILY
Qty: 60 TABLET | Refills: 1 | Status: SHIPPED | OUTPATIENT
Start: 2025-04-29 | End: 2025-06-28

## 2025-05-01 DIAGNOSIS — F41.1 GENERALIZED ANXIETY DISORDER: ICD-10-CM

## 2025-05-01 NOTE — TELEPHONE ENCOUNTER
Reason for call:   [x] Refill   [] Prior Auth  [] Other:     Office:   [] PCP/Provider -   [x] Specialty/Provider - Inna Styles    Medication: Citalopram     Dose/Frequency: 40 mg Daily     Quantity: 30    Pharmacy: [facility] [place]     Local Pharmacy   Does the patient have enough for 3 days?   [x] Yes   [] No - Send as HP to POD    Mail Away Pharmacy   Does the patient have enough for 10 days?   [] Yes   [] No - Send as HP to POD

## 2025-05-02 RX ORDER — CITALOPRAM HYDROBROMIDE 40 MG/1
40 TABLET ORAL DAILY
Qty: 30 TABLET | Refills: 0 | Status: SHIPPED | OUTPATIENT
Start: 2025-05-02

## 2025-05-02 NOTE — TELEPHONE ENCOUNTER
Rerouting to a different covering provider as Dr. Motta is now off. Mariza has an appointment in July.

## 2025-05-26 DIAGNOSIS — F41.1 GENERALIZED ANXIETY DISORDER: ICD-10-CM

## 2025-05-28 ENCOUNTER — TELEPHONE (OUTPATIENT)
Dept: PSYCHIATRY | Facility: CLINIC | Age: 20
End: 2025-05-28

## 2025-05-28 RX ORDER — HYDROXYZINE HYDROCHLORIDE 10 MG/1
10 TABLET, FILM COATED ORAL DAILY PRN
Qty: 10 TABLET | Refills: 0 | OUTPATIENT
Start: 2025-05-28

## 2025-05-28 NOTE — TELEPHONE ENCOUNTER
Called patient but had to leave voice mail message to sign consents that were sent via [employer], [employer] Consent, told where to obtain consents.    Left message must be signed ahead of appointment.   Left Call Back # [phone].

## 2025-05-29 ENCOUNTER — TELEPHONE (OUTPATIENT)
Age: 20
End: 2025-05-29

## 2025-05-29 NOTE — TELEPHONE ENCOUNTER
Patient is calling regarding cancelling an appointment.    Date/Time: 5/29/25 at 1030    Reason: patient on vacation in [place]    Patient was rescheduled: YES  [x]NO []  If yes, when was Patient reschedule for: 7/14/25 at 1:30p    Patient requesting call back to reschedule: YES [] NO [x]

## 2025-07-13 DIAGNOSIS — R10.31 RIGHT LOWER QUADRANT ABDOMINAL PAIN: ICD-10-CM

## 2025-07-13 DIAGNOSIS — K58.2 IRRITABLE BOWEL SYNDROME WITH BOTH CONSTIPATION AND DIARRHEA: ICD-10-CM

## 2025-07-13 DIAGNOSIS — R14.0 ABDOMINAL BLOATING: ICD-10-CM

## 2025-07-14 ENCOUNTER — TELEMEDICINE (OUTPATIENT)
Dept: PSYCHIATRY | Facility: CLINIC | Age: 20
End: 2025-07-14
Payer: COMMERCIAL

## 2025-07-14 ENCOUNTER — TELEPHONE (OUTPATIENT)
Age: 20
End: 2025-07-14

## 2025-07-14 DIAGNOSIS — F41.1 GENERALIZED ANXIETY DISORDER: Primary | ICD-10-CM

## 2025-07-14 DIAGNOSIS — F41.1 GENERALIZED ANXIETY DISORDER: ICD-10-CM

## 2025-07-14 PROCEDURE — 98005 SYNCH AUDIO-VIDEO EST LOW 20: CPT | Performed by: PHYSICIAN ASSISTANT

## 2025-07-14 RX ORDER — CITALOPRAM HYDROBROMIDE 40 MG/1
40 TABLET ORAL DAILY
Qty: 30 TABLET | Refills: 0 | Status: SHIPPED | OUTPATIENT
Start: 2025-07-14 | End: 2025-07-14

## 2025-07-14 RX ORDER — HYDROXYZINE HYDROCHLORIDE 25 MG/1
25 TABLET, FILM COATED ORAL DAILY PRN
Qty: 30 TABLET | Refills: 2 | Status: SHIPPED | OUTPATIENT
Start: 2025-07-14

## 2025-07-14 RX ORDER — CITALOPRAM HYDROBROMIDE 40 MG/1
40 TABLET ORAL DAILY
Qty: 90 TABLET | Refills: 0 | Status: SHIPPED | OUTPATIENT
Start: 2025-07-14 | End: 2025-10-12

## 2025-07-14 NOTE — ASSESSMENT & PLAN NOTE
Continue Celexa 40 mg daily for anxiety  Will discontinue Buspar- patient did not trial due to improvement of symptoms  Will start Atarax 25 mg (may take up to two tabs 50 mg) as needed for anxiety       Orders:    hydrOXYzine HCL (ATARAX) 25 mg tablet; Take 1 tablet (25 mg total) by mouth daily as needed for anxiety    citalopram (CeleXA) 40 mg tablet; Take 1 tablet (40 mg total) by mouth daily

## 2025-07-14 NOTE — PSYCH
MEDICATION MANAGEMENT NOTE    Name: Asia Alford      : 2005      MRN: [id number]  Encounter Provider: Inna Monet PA-C  Encounter Date: 2025   Encounter department: [facility]    Insurance: Payor: AET[employer] / Plan: [employer]ET[employer] PPO / Product Type: PPO /      Reason for Visit:   Chief Complaint   Patient presents with    Follow-up    Medication Management    Virtual Regular Visit   :  Assessment & Plan  Generalized anxiety disorder  Continue Celexa 40 mg daily for anxiety  Will discontinue Buspar- patient did not trial due to improvement of symptoms  Will start Atarax 25 mg (may take up to two tabs 50 mg) as needed for anxiety       Orders:    hydrOXYzine HCL (ATARAX) 25 mg tablet; Take 1 tablet (25 mg total) by mouth daily as needed for anxiety    citalopram (CeleXA) 40 mg tablet; Take 1 tablet (40 mg total) by mouth daily        Treatment Recommendations:    Educated about diagnosis and treatment modalities. Verbalizes understanding and agreement with the treatment plan.  Discussed self monitoring of symptoms, and symptom monitoring tools.  Discussed medications and if treatment adjustment was needed or desired.  Medication management every 3 months  Aware of 24 hour and weekend coverage for urgent situations accessed by calling [facility] main practice number  I am scheduling this patient out for greater than 3 months: No    Medications Risks/Benefits:      Risks, Benefits And Possible Side Effects Of Medications:    Risks, benefits, and possible side effects of medications explained to Mariza and she (or legal representative) verbalizes understanding and agreement for treatment.    Controlled Medication Discussion:     Not applicable      History of Present Illness     Mariza is seen today for a follow up for anxiety. She reports that she moved to [place] with her Grandmother to care for her in October. She passed away in May. She did not feel  comfortable trying the buspar. She has continued with her Celexa and feels it has been helpful for her anxiety. She denies any depressive symptoms. She has been sleeping and getting adequate nutrition.     She denies suicidal ideation, intent or plan at present; denies homicidal ideation, intent or plan at present.    She denies auditory hallucinations, denies visual hallucinations, denies delusions.    She denies any side effects from current psychiatric medications.    HPI ROS Appetite Changes and Sleep:     She reports decreased sleep, normal appetite, normal energy level    Review Of Systems: A review of systems is obtained and is negative except for the pertinent positives listed in HPI/Subjective above.      Current Rating Scores:     Current PHQ-9   PHQ-2/9 Depression Screening    Little interest or pleasure in doing things: 1 - several days  Feeling down, depressed, or hopeless: 1 - several days  Trouble falling or staying asleep, or sleeping too much: 3 - nearly every day  Feeling tired or having little energy: 3 - nearly every day  Poor appetite or overeatin - not at all  Feeling bad about yourself - or that you are a failure or have let yourself or your family down: 0 - not at all  Trouble concentrating on things, such as reading the newspaper or watching television: 0 - not at all  Moving or speaking so slowly that other people could have noticed. Or the opposite - being so fidgety or restless that you have been moving around a lot more than usual: 0 - not at all         Areas of Improvement: reviewed in HPI/Subjective Section      Past Medical History[1]  Past Surgical History[2]  Allergies: Allergies[3]    Current Outpatient Medications   Medication Instructions    benzonatate (TESSALON) 200 mg, Oral, 3 times daily PRN    citalopram (CELEXA) 40 mg, Oral, Daily    famotidine (PEPCID) 20 mg, Oral, 2 times daily    hydrOXYzine HCL (ATARAX) 25 mg, Oral, Daily PRN    linaCLOtide (Linzess) 290 MCG CAPS 1  capsule, Oral, Daily    phenazopyridine (PYRIDIUM) 200 mg, Oral, 3 times daily with meals    phenol (CHLORASEPTIC) 1.4 % mucosal liquid 1 spray, Mouth/Throat, Every 2 hour PRN    Plecanatide (Trulance) 3 MG TABS 1 tablet, Oral, Daily    Prucalopride Succinate (MOTEGRITY) 2 mg, Oral, Daily    Vienva 0.1-20 MG-MCG per tablet 1 tablet, Oral, Daily, Take at the same time        Substance Abuse History:    Tobacco, Alcohol and Drug Use History     Tobacco Use    Smoking status: Never    Smokeless tobacco: Never    Tobacco comments:     former vapor   Vaping Use    Vaping status: Every Day    Substances: Nicotine, Flavoring   Substance Use Topics    Alcohol use: Never    Drug use: Never     Alcohol Use: Not At Risk (6/10/2021)    AUDIT-C     Frequency of Alcohol Consumption: Never       Social History:    Social History     Socioeconomic History    Marital status: Single     Spouse name: Not on file    Number of children: Not on file    Years of education: Not on file    Highest education level: Not on file   Occupational History    Not on file   Other Topics Concern    Not on file   Social History Narrative    ** Merged History Encounter **             Family Psychiatric History:     Family History[4]    Medical History Reviewed by provider this encounter:  Tobacco  Allergies  Meds  Problems  Med Hx  Surg Hx  Fam Hx          Objective   There were no vitals taken for this visit.     Mental Status Evaluation:    Appearance age appropriate, casually dressed   Behavior cooperative   Speech normal rate, normal volume, normal pitch, spontaneous   Mood euthymic   Affect normal range and intensity, appropriate   Thought Processes organized, logical, coherent, goal directed   Thought Content no overt delusions   Perceptual Disturbances: no auditory hallucinations, no visual hallucinations   Abnormal Thoughts  Risk Potential Suicidal ideation - None at present  Homicidal ideation - None at present  Potential for aggression  - No   Orientation oriented to person, place, time/date, and situation   Memory recent and remote memory grossly intact   Consciousness alert and awake   Attention Span Concentration Span attention span and concentration are age appropriate   Intellect appears to be of average intelligence   Insight intact   Judgement intact   Muscle Strength and  Gait unable to assess today due to virtual visit   Motor activity unable to assess today due to virtual visit   Language no difficulty naming common objects, no difficulty repeating a phrase   Fund of Knowledge adequate knowledge of current events  adequate fund of knowledge regarding past history       Laboratory Results: not applicable     Suicide/Homicide Risk Assessment:    Risk of Harm to Self:  Based on today's assessment, Mariza presents the following risk of harm to self: none    Risk of Harm to Others:  Based on today's assessment, Mariza presents the following risk of harm to others: none    The following interventions are recommended: Continue medication management. No other intervention changes indicated at this time.    Psychotherapy Provided:     Individual psychotherapy provided: No    Treatment Plan:    Completed and signed during the session: not completed     Goals: Progress towards Treatment Plan goals - in Assessment and Plan Section        Note Share:    This note was not shared with the patient due to this is a psychotherapy note    Administrative Statements   Administrative Statements   Encounter provider Inna Monet PA-C    The Patient is located at Home and in the following state in which I hold an active license [place].    The patient was identified by name and date of birth. Asia Alford was informed that this is a telemedicine visit and that the visit is being conducted through the Epic Embedded platform. She agrees to proceed..  My office door was closed. No one else was in the room.  She acknowledged consent and understanding of privacy and  "security of the video platform. The patient has agreed to participate and understands they can discontinue the visit at any time.    I have spent a total time of 20 minutes in caring for this patient on the day of the visit/encounter including Risks and benefits of tx options, Instructions for management, Patient and family education, and Importance of tx compliance, not including the time spent for establishing the audio/video connection.    Visit Time  Visit Start Time: 130pm  Visit Stop Time: 150pm  Total Visit Duration: 20 minutes    Portions of the record may have been created with voice recognition software. Occasional wrong word or \"sound a like\" substitutions may have occurred due to the inherent limitations of voice recognition software. Read the chart carefully and recognize, using context, where substitutions have occurred.    Inna Monet PA-C 07/14/25         [1]   Past Medical History:  Diagnosis Date    Encopresis 6/10/2021   [2] No past surgical history on file.  [3] No Known Allergies  [4]   Family History  Problem Relation Name Age of Onset    No Known Problems Mother      Heart attack Father      Early death Father      Heart disease Father      Breast cancer Maternal Grandmother      Diabetes Maternal Grandmother      Hypertension Maternal Grandmother      Cancer Maternal Grandfather          Lung    Heart disease Maternal Grandfather      Depression Paternal Grandmother      Diabetes Paternal Grandmother      Heart disease Paternal Grandmother      Hypertension Paternal Grandmother      Mental illness Paternal Grandmother      Heart disease Paternal Grandfather      Stroke Paternal Grandfather      No Known Problems Sister      No Known Problems Brother      No Known Problems Maternal Aunt      No Known Problems Maternal Uncle      No Known Problems Paternal Aunt      No Known Problems Paternal Uncle       "

## 2025-07-14 NOTE — TELEPHONE ENCOUNTER
----- Message from Inna Monet PA-C sent at 7/14/2025  1:54 PM EDT -----  Regarding: Therapy Referral  Hi!    Please place patient on the waiting list for psychotherapy.    Thank you!!    Inna

## 2025-07-15 RX ORDER — PLECANATIDE 3 MG/1
1 TABLET ORAL DAILY
Qty: 30 TABLET | Refills: 5 | Status: SHIPPED | OUTPATIENT
Start: 2025-07-15

## 2025-07-21 ENCOUNTER — TELEPHONE (OUTPATIENT)
Age: 20
End: 2025-07-21

## 2025-08-04 DIAGNOSIS — K58.2 IRRITABLE BOWEL SYNDROME WITH BOTH CONSTIPATION AND DIARRHEA: ICD-10-CM

## 2025-08-04 DIAGNOSIS — F41.1 GENERALIZED ANXIETY DISORDER: ICD-10-CM

## 2025-08-04 DIAGNOSIS — R10.31 RIGHT LOWER QUADRANT ABDOMINAL PAIN: ICD-10-CM

## 2025-08-04 DIAGNOSIS — R14.0 ABDOMINAL BLOATING: ICD-10-CM

## 2025-08-04 RX ORDER — CITALOPRAM HYDROBROMIDE 40 MG/1
40 TABLET ORAL DAILY
Qty: 90 TABLET | Refills: 0 | OUTPATIENT
Start: 2025-08-04

## 2025-08-04 RX ORDER — LINACLOTIDE 290 UG/1
290 CAPSULE, GELATIN COATED ORAL DAILY
Qty: 90 CAPSULE | Refills: 0 | Status: CANCELLED | OUTPATIENT
Start: 2025-08-04

## 2025-08-05 DIAGNOSIS — F41.1 GENERALIZED ANXIETY DISORDER: ICD-10-CM

## 2025-08-05 RX ORDER — PLECANATIDE 3 MG/1
1 TABLET ORAL DAILY
Qty: 30 TABLET | Refills: 0 | Status: SHIPPED | OUTPATIENT
Start: 2025-08-05

## 2025-08-05 RX ORDER — HYDROXYZINE HYDROCHLORIDE 25 MG/1
25 TABLET, FILM COATED ORAL DAILY PRN
Qty: 90 TABLET | Refills: 1 | Status: SHIPPED | OUTPATIENT
Start: 2025-08-05

## 2025-08-05 RX ORDER — LINACLOTIDE 290 UG/1
CAPSULE, GELATIN COATED ORAL EVERY MORNING
Qty: 90 CAPSULE | Refills: 1 | Status: SHIPPED | OUTPATIENT
Start: 2025-08-05